# Patient Record
Sex: MALE | Race: WHITE | NOT HISPANIC OR LATINO | Employment: FULL TIME | URBAN - NONMETROPOLITAN AREA
[De-identification: names, ages, dates, MRNs, and addresses within clinical notes are randomized per-mention and may not be internally consistent; named-entity substitution may affect disease eponyms.]

---

## 2017-08-01 ENCOUNTER — OFFICE VISIT (OUTPATIENT)
Dept: URGENT CARE | Facility: CLINIC | Age: 37
End: 2017-08-01
Payer: COMMERCIAL

## 2017-08-01 PROCEDURE — 99213 OFFICE O/P EST LOW 20 MIN: CPT

## 2017-08-04 ENCOUNTER — ALLSCRIPTS OFFICE VISIT (OUTPATIENT)
Dept: OTHER | Facility: OTHER | Age: 37
End: 2017-08-04

## 2017-08-04 DIAGNOSIS — Z13.6 ENCOUNTER FOR SCREENING FOR CARDIOVASCULAR DISORDERS: ICD-10-CM

## 2017-08-04 DIAGNOSIS — E78.5 HYPERLIPIDEMIA: ICD-10-CM
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
## 2017-08-07 ENCOUNTER — TRANSCRIBE ORDERS (OUTPATIENT)
Dept: ADMINISTRATIVE | Facility: HOSPITAL | Age: 37
End: 2017-08-07

## 2017-08-07 ENCOUNTER — APPOINTMENT (OUTPATIENT)
Dept: LAB | Facility: HOSPITAL | Age: 37
End: 2017-08-07
Payer: COMMERCIAL
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
## 2017-08-07 PROCEDURE — 36415 COLL VENOUS BLD VENIPUNCTURE: CPT

## 2017-08-07 PROCEDURE — 86618 LYME DISEASE ANTIBODY: CPT

## 2017-08-07 PROCEDURE — 86617 LYME DISEASE ANTIBODY: CPT

## 2017-08-08 LAB
B BURGDOR IGG SER IA-ACNC: 0.26
B BURGDOR IGM SER IA-ACNC: 19.16

## 2017-08-10 LAB
B BURGDOR IGG PATRN SER IB-IMP: NEGATIVE
B BURGDOR IGM PATRN SER IB-IMP: NEGATIVE
B BURGDOR18KD IGG SER QL IB: ABNORMAL
B BURGDOR23KD IGG SER QL IB: PRESENT
B BURGDOR23KD IGM SER QL IB: PRESENT
B BURGDOR28KD IGG SER QL IB: ABNORMAL
B BURGDOR30KD IGG SER QL IB: PRESENT
B BURGDOR39KD IGG SER QL IB: ABNORMAL
B BURGDOR39KD IGM SER QL IB: ABNORMAL
B BURGDOR41KD IGG SER QL IB: PRESENT
B BURGDOR41KD IGM SER QL IB: ABNORMAL
B BURGDOR45KD IGG SER QL IB: PRESENT
B BURGDOR58KD IGG SER QL IB: ABNORMAL
B BURGDOR66KD IGG SER QL IB: ABNORMAL
B BURGDOR93KD IGG SER QL IB: ABNORMAL

## 2017-09-27 ENCOUNTER — TRANSCRIBE ORDERS (OUTPATIENT)
Dept: ADMINISTRATIVE | Facility: HOSPITAL | Age: 37
End: 2017-09-27

## 2017-09-27 ENCOUNTER — APPOINTMENT (OUTPATIENT)
Dept: LAB | Facility: HOSPITAL | Age: 37
End: 2017-09-27
Payer: COMMERCIAL

## 2017-09-27 DIAGNOSIS — Z13.6 ENCOUNTER FOR SCREENING FOR CARDIOVASCULAR DISORDERS: ICD-10-CM

## 2017-09-27 DIAGNOSIS — A69.20 LYME DISEASE: ICD-10-CM

## 2017-09-27 DIAGNOSIS — E78.5 HYPERLIPIDEMIA: ICD-10-CM

## 2017-09-27 LAB
ALBUMIN SERPL BCP-MCNC: 4.1 G/DL (ref 3.5–5)
ALP SERPL-CCNC: 109 U/L (ref 46–116)
ALT SERPL W P-5'-P-CCNC: 56 U/L (ref 12–78)
ANION GAP SERPL CALCULATED.3IONS-SCNC: 12 MMOL/L (ref 4–13)
AST SERPL W P-5'-P-CCNC: 26 U/L (ref 5–45)
BASOPHILS # BLD AUTO: 0.06 THOUSANDS/ΜL (ref 0–0.1)
BASOPHILS NFR BLD AUTO: 1 % (ref 0–1)
BILIRUB SERPL-MCNC: 0.7 MG/DL (ref 0.2–1)
BUN SERPL-MCNC: 16 MG/DL (ref 5–25)
CALCIUM SERPL-MCNC: 9.3 MG/DL (ref 8.3–10.1)
CHLORIDE SERPL-SCNC: 101 MMOL/L (ref 100–108)
CHOLEST SERPL-MCNC: 439 MG/DL (ref 50–200)
CO2 SERPL-SCNC: 25 MMOL/L (ref 21–32)
CREAT SERPL-MCNC: 0.91 MG/DL (ref 0.6–1.3)
EOSINOPHIL # BLD AUTO: 0.13 THOUSAND/ΜL (ref 0–0.61)
EOSINOPHIL NFR BLD AUTO: 2 % (ref 0–6)
ERYTHROCYTE [DISTWIDTH] IN BLOOD BY AUTOMATED COUNT: 12.3 % (ref 11.6–15.1)
GFR SERPL CREATININE-BSD FRML MDRD: 106 ML/MIN/1.73SQ M
GLUCOSE P FAST SERPL-MCNC: 96 MG/DL (ref 65–99)
HCT VFR BLD AUTO: 47 % (ref 36.5–49.3)
HDLC SERPL-MCNC: 63 MG/DL (ref 40–60)
HGB BLD-MCNC: 16.2 G/DL (ref 12–17)
INSULIN SERPL-ACNC: 9.3 MU/L (ref 3–25)
LDLC SERPL CALC-MCNC: 355 MG/DL (ref 0–100)
LYMPHOCYTES # BLD AUTO: 1.59 THOUSANDS/ΜL (ref 0.6–4.47)
LYMPHOCYTES NFR BLD AUTO: 27 % (ref 14–44)
MCH RBC QN AUTO: 30.9 PG (ref 26.8–34.3)
MCHC RBC AUTO-ENTMCNC: 34.5 G/DL (ref 31.4–37.4)
MCV RBC AUTO: 90 FL (ref 82–98)
MONOCYTES # BLD AUTO: 0.55 THOUSAND/ΜL (ref 0.17–1.22)
MONOCYTES NFR BLD AUTO: 10 % (ref 4–12)
NEUTROPHILS # BLD AUTO: 3.49 THOUSANDS/ΜL (ref 1.85–7.62)
NEUTS SEG NFR BLD AUTO: 60 % (ref 43–75)
PLATELET # BLD AUTO: 238 THOUSANDS/UL (ref 149–390)
PMV BLD AUTO: 9.8 FL (ref 8.9–12.7)
POTASSIUM SERPL-SCNC: 4.4 MMOL/L (ref 3.5–5.3)
PROT SERPL-MCNC: 7.6 G/DL (ref 6.4–8.2)
RBC # BLD AUTO: 5.24 MILLION/UL (ref 3.88–5.62)
SODIUM SERPL-SCNC: 138 MMOL/L (ref 136–145)
TRIGL SERPL-MCNC: 104 MG/DL
TSH SERPL DL<=0.05 MIU/L-ACNC: 2.56 UIU/ML (ref 0.36–3.74)
WBC # BLD AUTO: 5.82 THOUSAND/UL (ref 4.31–10.16)

## 2017-09-27 PROCEDURE — 86618 LYME DISEASE ANTIBODY: CPT

## 2017-09-27 PROCEDURE — 36415 COLL VENOUS BLD VENIPUNCTURE: CPT

## 2017-09-27 PROCEDURE — 86617 LYME DISEASE ANTIBODY: CPT

## 2017-09-27 PROCEDURE — 84443 ASSAY THYROID STIM HORMONE: CPT

## 2017-09-27 PROCEDURE — 80053 COMPREHEN METABOLIC PANEL: CPT

## 2017-09-27 PROCEDURE — 83525 ASSAY OF INSULIN: CPT

## 2017-09-27 PROCEDURE — 80061 LIPID PANEL: CPT

## 2017-09-27 PROCEDURE — 85025 COMPLETE CBC W/AUTO DIFF WBC: CPT

## 2017-09-28 LAB
B BURGDOR IGG SER IA-ACNC: 0.71
B BURGDOR IGM SER IA-ACNC: 2.14

## 2017-09-29 LAB
B BURGDOR IGG PATRN SER IB-IMP: NEGATIVE
B BURGDOR IGM PATRN SER IB-IMP: NEGATIVE
B BURGDOR18KD IGG SER QL IB: ABNORMAL
B BURGDOR23KD IGG SER QL IB: PRESENT
B BURGDOR23KD IGM SER QL IB: PRESENT
B BURGDOR28KD IGG SER QL IB: ABNORMAL
B BURGDOR30KD IGG SER QL IB: PRESENT
B BURGDOR39KD IGG SER QL IB: ABNORMAL
B BURGDOR39KD IGM SER QL IB: ABNORMAL
B BURGDOR41KD IGG SER QL IB: PRESENT
B BURGDOR41KD IGM SER QL IB: ABNORMAL
B BURGDOR45KD IGG SER QL IB: ABNORMAL
B BURGDOR58KD IGG SER QL IB: ABNORMAL
B BURGDOR66KD IGG SER QL IB: ABNORMAL
B BURGDOR93KD IGG SER QL IB: ABNORMAL

## 2017-10-04 ENCOUNTER — ALLSCRIPTS OFFICE VISIT (OUTPATIENT)
Dept: OTHER | Facility: OTHER | Age: 37
End: 2017-10-04

## 2017-11-15 DIAGNOSIS — E78.5 HYPERLIPIDEMIA: ICD-10-CM

## 2017-12-16 NOTE — PROGRESS NOTES
Assessment  1  Hyperlipidemia (272 4) (E78 5)   2  History of Lyme disease (V12 09) (Z86 19)   3  Encounter for preventive health examination (V70 0) (Z00 00)    Plan  Hyperlipidemia    · Begin or continue regular aerobic exercise  Gradually work up to at least 3 sessions of30 minutes of exercise a week ; Status:Complete;   Done: 10LCM4065   · Eat no more than 30 grams of fat per day ; Status:Complete;   Done: 73KJF4446   · There are many exercise options for seniors ; Status:Complete;   Done: 63XVS4817   · Call [phone] if: You have muscle cramps ; Status:Complete;   Done: 67HGB7821   · Call [phone] if: You have pain in the stomach area ; Status:Complete;   Done:54Cab1509   · Call [phone] if: You start vomiting ; Status:Complete;   Done: 23LXX8685   · Call 911 if: You experience a new kind of chest pain (angina) or pressure  ;Status:Complete;   Done: 95LGB4116   · Call 911 if: You have any symptoms of a stroke ; Status:Complete;   Done: 17RAQ9824   · (1) COMPREHENSIVE METABOLIC PANEL; Status:Active; Requested ELQ:72LAK6446;    · (1) COMPREHENSIVE METABOLIC PANEL; Status:Active; Requested for:06Rwb7950;    · (1) LIPID PANEL, FASTING; Status:Active; Requested VHL:92KJQ5167;    · (1) TSH; Status:Active; Requested IUR:42ZYH7441; Discussion/Summary    Reviewed his recent laboratory testing with him  Cholesterol is significantly elevated  He has been started on the atorvastatin  Has been tolerating this the us far  Potential side effects discussed  Watch diet more closely  Increase fiber  Increase activity level  Remainder of laboratory testing was good  He did have classic symptoms of Lyme disease over the summer  The symptoms responded to full course of doxycycline  Watch for any recurrence symptoms  Consider flu shot  Will have him follow up in about 3-4 months with laboratory testing prior to that visit  Follow up sooner if needed   He was told to get a repeat CMP done about 6-8 weeks after starting them medication for the cholesterol  Possible side effects of new medications were reviewed with the patient/guardian today  The treatment plan was reviewed with the patient/guardian  The patient/guardian understands and agrees with the treatment plan      Chief Complaint  CC Patient is here for follow up visit  No complaints  History of Present Illness  He presents to establish as a new patient  Has generally been doing relatively well  He did have a rash and fevers, chills, and body aches over the summer months  Lyme testing was equivocal but his symptoms were classic for Lyme disease  Symptoms resolved completely after full course of doxycycline  Remains very active  Tries to exercise on a regular basis  Remains very active with his kids  Has started the atorvastatin for the hyperlipidemia  Has not had any side effects thus far  Has a strong family history of high cholesterol  Denies any chest pain or palpitations  Denies any significant shortness of breath  Appetite has been generally good  Has been trying to watch what he eats but admits this is difficult at times  Denies any vision problems  Denies any nausea, vomiting, or diarrhea  Denies any changes in bowel movements  Usually sleeps relatively well  Review of Systems   Constitutional: not feeling poorly-- and-- not feeling tired  Eyes: no eyesight problems  ENT: no nosebleeds  Cardiovascular: no chest pain-- and-- no palpitations  Respiratory: no shortness of breath-- and-- no shortness of breath during exertion  Gastrointestinal: no nausea-- and-- no diarrhea  Genitourinary: no nocturia  Musculoskeletal: joint stiffness  Neurological: no headache-- and-- no dizziness  ROS reviewed  Active Problems  1  History of Lyme disease (V12 09) (Z86 19)   2  Hyperlipidemia (272 4) (E78 5)   3  Screening for cardiovascular condition (V81 2) (Z13 6)   4  Tick bite (919 4,E906 4) [facility])    Past Medical History  1   No pertinent past medical history    The active problems and past medical history were reviewed and updated today  Surgical History    The surgical history was reviewed and updated today  Family History  Family History    1  Family history of hyperlipidemia (V18 19) (Z83 49)   2  Family history of skin cancer (V16 8) (Z80 8)    The family history was reviewed and updated today  Social History   · Alcohol use (V49 89) (Z78 9)   · Daily caffeinated coffee consumption   · Never a smoker  The social history was reviewed and updated today  Current Meds   1  Atorvastatin Calcium 20 MG Oral Tablet; TAKE 1 TABLET DAILY AS DIRECTED; Therapy: 16VSX4450 to (Evaluate:98Rjd3797)  Requested for: 59WER1153; Last Rx:21Lfw4772 Ordered    The medication list was reviewed and updated today  Allergies  1  No Known Drug Allergies    Vitals  Vital Signs    Recorded: 46OTF8414 07:52AM   Temperature 97 1 F, Temporal   Heart Rate 88   Respiration 20   Systolic 012, LUE, Sitting   Diastolic 88, LUE, Sitting   Height 6 ft 1 in   Weight 248 lb    BMI Calculated 32 72   BSA Calculated 2 36   O2 Saturation 98     Physical Exam   Constitutional  General appearance: No acute distress, well appearing and well nourished  Eyes  Conjunctiva and lids: No swelling, erythema, or discharge  Pupils and irises: Equal, round and reactive to light  Ears, Nose, Mouth, and Throat  External inspection of ears and nose: Normal    Otoscopic examination: Tympanic membrance translucent with normal light reflex  Canals patent without erythema  Oropharynx: Normal with no erythema, edema, exudate or lesions  Pulmonary  Auscultation of lungs: Clear to auscultation, equal breath sounds bilaterally, no wheezes, no rales, no rhonci  Cardiovascular  Auscultation of heart: Normal rate and rhythm, normal S1 and S2, without murmurs     Examination of extremities for edema and/or varicosities: Normal    Lymphatic  Palpation of lymph nodes in neck: No lymphadenopathy  Musculoskeletal  Gait and station: Normal    Psychiatric  Mood and affect: Normal          Results/Data  (1) COMPREHENSIVE METABOLIC PANEL 88ORO1909 58:49ER Benito Bertrand Order Number: [id number]     Test Name Result Flag Reference   SODIUM 138 mmol/L  136-145   POTASSIUM 4 4 mmol/L  3 5-5 3   CHLORIDE 101 mmol/L  100-108   CARBON DIOXIDE 25 mmol/L  21-32   ANION GAP (CALC) 12 mmol/L  4-13   BLOOD UREA NITROGEN 16 mg/dL  5-25   CREATININE 0 91 mg/dL  0 60-1 30   Standardized to IDMS reference method   CALCIUM 9 3 mg/dL  8 3-10 1   BILI, TOTAL 0 70 mg/dL  0 20-1 00   ALK PHOSPHATAS 109 U/L     ALT (SGPT) 56 U/L  12-78   Specimen collection should occur prior to Sulfasalazine administration due to the potential for falsely depressed results  AST(SGOT) 26 U/L  5-45   Specimen collection should occur prior to Sulfasalazine administration due to the potential for falsely depressed results  ALBUMIN 4 1 g/dL  3 5-5 0   TOTAL PROTEIN 7 6 g/dL  6 4-8 2   eGFR 106 ml/min/1 73sq m     [facility] Disease Education Program recommendations are as follows: GFR calculation is accurate only with a steady state creatinine Chronic Kidney disease less than 60 ml/min/1 73 sq  meters Kidney failure less than 15 ml/min/1 73 sq  meters  GLUCOSE FASTING 96 mg/dL  65-99   Specimen collection should occur prior to Sulfasalazine administration due to the potential for falsely depressed results  Specimen collection should occur prior to Sulfapyridine administration due to the potential for falsely elevated results       (1) CBC/PLT/DIFF 48OTY6798 07:52AM Benito Bertrand Order Number: [id number]     Test Name Result Flag Reference   WBC COUNT 5 82 Thousand/uL  4 31-10 16   RBC COUNT 5 24 Million/uL  3 88-5 62   HEMOGLOBIN 16 2 g/dL  12 0-17 0   HEMATOCRIT 47 0 %  36 5-49 3   MCV 90 fL  82-98   MCH 30 9 pg  26 8-34 3   MCHC 34 5 g/dL  31 4-37 4   RDW 12 3 %  11 6-15 1   MPV 9 8 fL  8 9-12 7   PLATELET COUNT 085 Thousands/uL  149-390   NEUTROPHILS RELATIVE PERCENT 60 %  43-75   LYMPHOCYTES RELATIVE PERCENT 27 %  14-44   MONOCYTES RELATIVE PERCENT 10 %  4-12   EOSINOPHILS RELATIVE PERCENT 2 %  0-6   BASOPHILS RELATIVE PERCENT 1 %  0-1   NEUTROPHILS ABSOLUTE COUNT 3 49 Thousands/? ??L  1 85-7 62   LYMPHOCYTES ABSOLUTE COUNT 1 59 Thousands/? ??L  0 60-4 47   MONOCYTES ABSOLUTE COUNT 0 55 Thousand/? ??L  0 17-1 22   EOSINOPHILS ABSOLUTE COUNT 0 13 Thousand/? ??L  0 00-0 61   BASOPHILS ABSOLUTE COUNT 0 06 Thousands/? ??L  0 00-0 10     (1) INSULIN, FASTING 75CVC6115 07:52AM Risk I/OsharonAtrica Justin Hands Order Number: [id number]     Test Name Result Flag Reference   INSULIN, FASTING 9 3 mU/L  3 0-25 0     (1) LIPID PANEL, FASTING 06NAI7386 07:52AM Risk I/OsharonAtrica Justin Hands Order Number: [id number]     Test Name Result Flag Reference   CHOLESTEROL 439 mg/dL H    HDL,DIRECT 63 mg/dL H 40-60   Specimen collection should occur prior to Metamizole administration due to the potential for falsley depressed results  LDL CHOLESTEROL CALCULATED 355 mg/dL H 0-100   Unable to calculate   Triglyceride:       Normal <150 mg/dl  Borderline High 150-199 mg/dl  High 200-499 mg/dl  Very High >499 mg/dl   Cholesterol:      Desirable <200 mg/dl   Borderline High 200-239 mg/dl   High >239 mg/dl   HDL Cholesterol:      High>59 mg/dL   Low <41 mg/dL   This screening LDL is a calculated result  It does not have the accuracy of the Direct Measured LDL in the monitoring of patients with hyperlipidemia and/or statin therapy  Direct Measure LDL (VUU140) must be ordered separately in these patients  TRIGLYCERIDES 104 mg/dL  <=150   Specimen collection should occur prior to N-Acetylcysteine or Metamizole administration due to the potential for falsely depressed results       (1) TSH 59VFT8794 07:52AM Thomsa Whatley    Order Number: [id number]     Test Name Result Flag Reference   TSH 2 565 uIU/mL  0 358-3 740   Patients undergoing fluorescein dye angiography may retain small amounts of fluorescein in the body for 48-72 hours post procedure  Samples containing fluorescein can produce falsely depressed TSH values  If the patient had this procedure,a specimen should be resubmitted post fluorescein clearance  (1) LYME ANTIBODY PROFILE [facility] TO WESTERN BLOT 57ABB1883 07:52DAVIN Underwood    Order Number: [id number]     Test Name Result Flag Reference   LYME IGG 0 71  0 00-0 79   NEGATIVE(0 00-0 79)-Absence of detectable Borrelia IgG Antibodies  A negative result does not exclude the possibility of Borrelia infection  If early Lyme disease is suspected,a second sample should be collected & tested 4 weeks after initial testing  LYME IGM 2 14 H 0 00-0 79   POSITIVE (> or = 1 20) - Presence of Borrelia IgM Antibodies  Current testing guidelines recommend that all positive samples be supplemented by further testing  Sample forwarded to reference lab for western blot assay  (1) LYME ANTIBODY PROFILE [facility] TO WESTERN BLOT [id number] 07:52 Sylvia Underwood    Order Number: [id number]     Test Name Result Flag Reference   LYME IGG 0 71  0 00-0 79   NEGATIVE(0 00-0 79)-Absence of detectable Borrelia IgG Antibodies  A negative result does not exclude the possibility of Borrelia infection  If early Lyme disease is suspected,a second sample should be collected & tested 4 weeks after initial testing  LYME IGM 2 14 H 0 00-0 79   POSITIVE (> or = 1 20) - Presence of Borrelia IgM Antibodies  Current testing guidelines recommend that all positive samples be supplemented by further testing  Sample forwarded to reference lab for western blot assay     LYME 18 KD IGG Absent     LYME 23 KD IGG Present A    LYME 28 KD IGG Absent     LYME 30 KD IGG Present A    LYME 39 KD IGG Absent     LYME 39 KD IGM Absent     LYME 41 KD IGG Present A    LYME 45 KD IGG Absent     LYME 58 KD IGG Absent     LYME 66 KD IGG Absent     LYME 93 KD IGG Absent     LYME 23 KD IGM Present A    LYME 41 KD IGM Absent     LYME IGG WB INTERP  Negative     Positive: 5 of the following                                Borrelia-specific bands:                                18,23,28,30,39,41,45,58,                                66, and 93  Negative: No bands or banding                                patterns which do not                                meet positive criteria  LYME IGM WB INTERP  Negative     Note: An equivocal or positive EIA result followed by a negative Western Blot result is considered NEGATIVE  An equivocal or positive EIA result followed by a positive Western Blot is considered POSITIVE by the CDC  Positive: 2 of the following bands: 23,39 or 41 Negative: No bands or banding patterns which do not meet positive criteria  Criteria for positivity are those recommended by CDC/ASTPHLD  p23=Osp C, i87=pbygmajap Note: Sera from individuals with the following may cross react in the Lyme Western Blot assays: other spirochetal diseases (periodontal disease, leptospirosis, relapsing fever, yaws, and pinta); connective autoimmune (Rheumatoid Arthritis and Systemic Lupus Erythematosus and also individuals with Antinuclear Antibody); other infections COFFEE [facility] Spotted Fever; Lindy-Barr Virus, and Cytomegalovirus)  Performed at:  [address] [place] [address]  [id number] : Callum Capellan MD, Phone:  [phone]     Future Appointments    Date/Time Provider Specialty Site   01/03/2018 08:20 AM ADRIAN Arthur   [address] [facility] PRIMARY CARE [place] [address]     Signatures   Electronically signed by : ADRIAN Anthony ; Dec 15 2017 12:40PM EST                       (Author)

## 2018-01-03 DIAGNOSIS — E78.5 HYPERLIPIDEMIA: ICD-10-CM

## 2018-01-12 VITALS
BODY MASS INDEX: 32.87 KG/M2 | OXYGEN SATURATION: 98 % | HEIGHT: 73 IN | RESPIRATION RATE: 20 BRPM | SYSTOLIC BLOOD PRESSURE: 132 MMHG | TEMPERATURE: 97.1 F | DIASTOLIC BLOOD PRESSURE: 88 MMHG | HEART RATE: 88 BPM | WEIGHT: 248 LBS

## 2018-01-14 VITALS
SYSTOLIC BLOOD PRESSURE: 124 MMHG | TEMPERATURE: 96.4 F | WEIGHT: 249 LBS | DIASTOLIC BLOOD PRESSURE: 80 MMHG | OXYGEN SATURATION: 98 % | BODY MASS INDEX: 33 KG/M2 | HEART RATE: 82 BPM | RESPIRATION RATE: 20 BRPM | HEIGHT: 73 IN

## 2023-01-23 ENCOUNTER — OFFICE VISIT (OUTPATIENT)
Dept: URGENT CARE | Facility: MEDICAL CENTER | Age: 43
End: 2023-01-23

## 2023-01-23 DIAGNOSIS — S69.92XA INJURY OF LEFT RING FINGER, INITIAL ENCOUNTER: Primary | ICD-10-CM

## 2023-01-23 NOTE — PATIENT INSTRUCTIONS
Monitor for change in symptoms, any change or worsening of symptoms please be re-evaluated immediately

## 2023-01-23 NOTE — PROGRESS NOTES
[facility]        NAME: Sita Medina is a 38 y o  male  : 1980    MRN: [id number]  DATE: 2023  TIME: 12:16 PM    Assessment and Plan   Injury of left ring finger, initial encounter [S69 92XA]  1  Injury of left ring finger, initial encounter              Patient Instructions     Please see the patient's After Visit Summary for patient provided instructions  Other verbal instructions given as noted within  Follow up with PCP in 3-5 days  Proceed to  ER if symptoms worsen  Chief Complaint   No chief complaint on file  History of Present Illness       Patient Jammed left ring finger yesterday  Patient put ice on it last night, but this AM noted increased swelling and started with some tingling  He had previously not been able to get his ring off due to increased weight - presents today for assistance of getting wedding ring off  Patient has good ROM  Review of Systems   Review of Systems   Constitutional: Negative for chills  HENT: Negative  Eyes: Negative  Respiratory: Negative  Cardiovascular: Negative  Musculoskeletal:        Left ring finger swollen/ecchymotic  All other systems reviewed and are negative  Current Medications     No current outpatient medications on file  Current Allergies     Allergies as of 2023   • (No Known Allergies)            The following portions of the patient's history were reviewed and updated as appropriate: allergies, current medications, past family history, past medical history, past social history, past surgical history and problem list      History reviewed  No pertinent past medical history  History reviewed  No pertinent surgical history  History reviewed  No pertinent family history  Medications have been verified  Objective   There were no vitals taken for this visit  Physical Exam     Physical Exam  Vitals and nursing note reviewed     Constitutional: Appearance: Normal appearance  He is normal weight  HENT:      Head: Normocephalic  Mouth/Throat:      Mouth: Mucous membranes are moist    Eyes:      Extraocular Movements: Extraocular movements intact  Conjunctiva/sclera: Conjunctivae normal       Pupils: Pupils are equal, round, and reactive to light  Cardiovascular:      Rate and Rhythm: Normal rate  Pulses: Normal pulses  Pulmonary:      Effort: Pulmonary effort is normal    Musculoskeletal:         General: Swelling and signs of injury present  No tenderness  Right hand: Normal       Left hand: Swelling present  No deformity, lacerations or tenderness  Normal range of motion  Normal capillary refill  Normal pulse  Hands:       Cervical back: Normal range of motion  Comments: Offered to do xray- due to patient's injury- patient declined at this time  Patient initially had some tingling and numbness I finger, however as soon as the ring was removed he said it instantly was coming back  Skin:     General: Skin is warm  Capillary Refill: Capillary refill takes less than 2 seconds  Neurological:      General: No focal deficit present  Mental Status: He is alert  Psychiatric:         Mood and Affect: Mood normal      Universal Protocol:  Consent: Verbal consent obtained  Risks and benefits: risks, benefits and alternatives were discussed  Consent given by: patient  Time out: Immediately prior to procedure a "time out" was called to verify the correct patient, procedure, equipment, support staff and site/side marked as required    Patient understanding: patient states understanding of the procedure being performed  Patient consent: the patient's understanding of the procedure matches consent given  Procedure consent: procedure consent matches procedure scheduled    Foreign body removal    Date/Time: 1/23/2023 12:02 PM  Performed by: CHAPIN Spain  Authorized by: CHAPIN Spain   Body area: skin  General location: upper extremity  Location details: left ring finger    Sedation:  Patient sedated: no  Patient restrained: no  Complexity: simple  1 objects recovered  Objects recovered: Wedding band cut using ring cutter, 1 cut needed  Post-procedure assessment: foreign body removed  Patient tolerance: patient tolerated the procedure well with no immediate complications  Comments: Patient had ring in hand upon discharge from the department  Instructed on monitoring finger/extremity for worsening of symptoms

## 2023-07-10 ENCOUNTER — OFFICE VISIT (OUTPATIENT)
Dept: FAMILY MEDICINE CLINIC | Facility: CLINIC | Age: 43
End: 2023-07-10
Payer: COMMERCIAL

## 2023-07-10 VITALS
HEIGHT: 73 IN | DIASTOLIC BLOOD PRESSURE: 92 MMHG | WEIGHT: 253.4 LBS | HEART RATE: 108 BPM | BODY MASS INDEX: 33.58 KG/M2 | TEMPERATURE: 98.6 F | SYSTOLIC BLOOD PRESSURE: 148 MMHG | OXYGEN SATURATION: 98 %

## 2023-07-10 DIAGNOSIS — M79.645 FINGER PAIN, LEFT: ICD-10-CM

## 2023-07-10 DIAGNOSIS — Z76.89 ENCOUNTER TO ESTABLISH CARE WITH NEW DOCTOR: Primary | ICD-10-CM

## 2023-07-10 DIAGNOSIS — Z23 ENCOUNTER FOR IMMUNIZATION: ICD-10-CM

## 2023-07-10 DIAGNOSIS — I10 ESSENTIAL HYPERTENSION: ICD-10-CM

## 2023-07-10 DIAGNOSIS — E78.00 HYPERCHOLESTEROLEMIA: ICD-10-CM

## 2023-07-10 DIAGNOSIS — Z11.59 NEED FOR HEPATITIS C SCREENING TEST: ICD-10-CM

## 2023-07-10 DIAGNOSIS — E66.09 CLASS 1 OBESITY DUE TO EXCESS CALORIES WITH SERIOUS COMORBIDITY AND BODY MASS INDEX (BMI) OF 33.0 TO 33.9 IN ADULT: ICD-10-CM

## 2023-07-10 DIAGNOSIS — Z11.4 SCREENING FOR HIV (HUMAN IMMUNODEFICIENCY VIRUS): ICD-10-CM

## 2023-07-10 PROCEDURE — 99203 OFFICE O/P NEW LOW 30 MIN: CPT | Performed by: FAMILY MEDICINE

## 2023-07-10 PROCEDURE — 90471 IMMUNIZATION ADMIN: CPT

## 2023-07-10 PROCEDURE — 90715 TDAP VACCINE 7 YRS/> IM: CPT

## 2023-07-10 NOTE — PROGRESS NOTES
Assessment/Plan:    No problem-specific Assessment & Plan notes found for this encounter. Diagnoses and all orders for this visit:    Encounter to establish care with new doctor    Essential hypertension  -     CBC and differential; Future  -     TSH, 3rd generation with Free T4 reflex; Future    Hypercholesterolemia  -     Comprehensive metabolic panel; Future  -     Lipid panel; Future    Class 1 obesity due to excess calories with serious comorbidity and body mass index (BMI) of 33.0 to 33.9 in adult  Comments:  pt counseled on diet and exercise    Need for hepatitis C screening test  -     Hepatitis C Antibody; Future    Screening for HIV (human immunodeficiency virus)  -     HIV 1/2 AG/AB w Reflex [facility] for 2 yr old and above; Future    Encounter for immunization  -     TDAP VACCINE GREATER THAN OR EQUAL TO 8YO IM    Finger pain, left  -     XR hand 3+ vw left; Future          PHQ-2/9 Depression Screening    Little interest or pleasure in doing things: 0 - not at all  Feeling down, depressed, or hopeless: 0 - not at all  PHQ-2 Score: 0  PHQ-2 Interpretation: Negative depression screen        BMI Counseling: Body mass index is 33.43 kg/m². The BMI is above normal. Nutrition recommendations include reducing portion sizes and 3-5 servings of fruits/vegetables daily. Exercise recommendations include moderate aerobic physical activity for 150 minutes/week and exercising 3-5 times per week. Subjective:      Patient ID: Rosita Gee is a 37 y.o. male.     New pt here to establish with hypercholesterolemia, no surgeries, non smoker, regular EtoH, no illegal drugs, , 3 children 23, 12, 15, works as  at [address] [employer] following portions of the patient's history were reviewed and updated as appropriate: allergies, current medications, past family history, past medical history, past social history, past surgical history and problem list.    Review of Systems   Constitutional: Negative. Negative for chills, fatigue and fever. HENT: Negative. Negative for hearing loss. Eyes: Negative. Negative for visual disturbance. Respiratory: Negative for shortness of breath and wheezing. Cardiovascular: Negative for chest pain and palpitations. Gastrointestinal: Negative for abdominal pain, blood in stool, constipation, diarrhea, nausea and vomiting. Endocrine: Negative. Genitourinary: Negative for difficulty urinating and dysuria. Musculoskeletal: Negative for arthralgias and myalgias. Skin: Negative. Allergic/Immunologic: Negative. Neurological: Negative for seizures and syncope. Hematological: Negative for adenopathy. Psychiatric/Behavioral: Negative. Objective:    /92 (BP Location: Left arm, Patient Position: Sitting)   Pulse (!) 108   Temp 98.6 °F (37 °C) (Tympanic)   Ht 6' 1" (1.854 m)   Wt 115 kg (253 lb 6.4 oz)   SpO2 98%   BMI 33.43 kg/m²      Physical Exam  Vitals and nursing note reviewed. Constitutional:       General: He is not in acute distress. Appearance: Normal appearance. He is well-developed. He is not ill-appearing, toxic-appearing or diaphoretic. HENT:      Head: Normocephalic and atraumatic. Right Ear: Tympanic membrane, ear canal and external ear normal. There is no impacted cerumen. Left Ear: Tympanic membrane, ear canal and external ear normal. There is no impacted cerumen. Nose: Nose normal. No congestion or rhinorrhea. Mouth/Throat:      Mouth: Mucous membranes are moist.      Pharynx: Oropharynx is clear. No oropharyngeal exudate or posterior oropharyngeal erythema. Eyes:      General: No scleral icterus. Right eye: No discharge. Left eye: No discharge. Extraocular Movements: Extraocular movements intact. Conjunctiva/sclera: Conjunctivae normal.      Pupils: Pupils are equal, round, and reactive to light.    Cardiovascular:      Rate and Rhythm: Normal rate and regular rhythm. Pulses: Normal pulses. Heart sounds: Normal heart sounds. No murmur heard. No friction rub. No gallop. Pulmonary:      Effort: Pulmonary effort is normal. No respiratory distress. Breath sounds: Normal breath sounds. No wheezing, rhonchi or rales. Abdominal:      General: Bowel sounds are normal. There is no distension. Palpations: Abdomen is soft. There is no mass. Tenderness: There is no abdominal tenderness. There is no guarding or rebound. Musculoskeletal:         General: No swelling or deformity. Normal range of motion. Cervical back: Normal range of motion and neck supple. No rigidity. Right lower leg: No edema. Left lower leg: No edema. Lymphadenopathy:      Cervical: No cervical adenopathy. Skin:     General: Skin is warm and dry. Findings: No rash. Neurological:      General: No focal deficit present. Mental Status: He is alert and oriented to person, place, and time. Sensory: No sensory deficit. Motor: No weakness. Coordination: Coordination normal.      Gait: Gait normal.      Deep Tendon Reflexes: Reflexes are normal and symmetric. Reflexes normal.   Psychiatric:         Mood and Affect: Mood normal.         Behavior: Behavior normal.         Thought Content:  Thought content normal.         Judgment: Judgment normal.

## 2023-07-19 ENCOUNTER — HOSPITAL ENCOUNTER (OUTPATIENT)
Dept: RADIOLOGY | Facility: HOSPITAL | Age: 43
Discharge: HOME | End: 2023-07-19
Payer: COMMERCIAL

## 2023-07-19 ENCOUNTER — APPOINTMENT (OUTPATIENT)
Dept: LAB | Facility: HOSPITAL | Age: 43
End: 2023-07-19
Payer: COMMERCIAL

## 2023-07-19 DIAGNOSIS — Z11.4 SCREENING FOR HIV (HUMAN IMMUNODEFICIENCY VIRUS): ICD-10-CM

## 2023-07-19 DIAGNOSIS — E78.00 HYPERCHOLESTEROLEMIA: ICD-10-CM

## 2023-07-19 DIAGNOSIS — I10 ESSENTIAL HYPERTENSION: ICD-10-CM

## 2023-07-19 DIAGNOSIS — M79.645 FINGER PAIN, LEFT: ICD-10-CM

## 2023-07-19 DIAGNOSIS — Z11.59 NEED FOR HEPATITIS C SCREENING TEST: ICD-10-CM

## 2023-07-19 LAB
ALBUMIN SERPL BCP-MCNC: 4.4 G/DL (ref 3.5–5)
ALP SERPL-CCNC: 95 U/L (ref 34–104)
ALT SERPL W P-5'-P-CCNC: 28 U/L (ref 7–52)
ANION GAP SERPL CALCULATED.3IONS-SCNC: 8 MMOL/L
AST SERPL W P-5'-P-CCNC: 19 U/L (ref 13–39)
BASOPHILS # BLD AUTO: 0.08 THOUSANDS/ÂΜL (ref 0–0.1)
BASOPHILS NFR BLD AUTO: 1 % (ref 0–1)
BILIRUB SERPL-MCNC: 0.52 MG/DL (ref 0.2–1)
BUN SERPL-MCNC: 12 MG/DL (ref 5–25)
CALCIUM SERPL-MCNC: 9.8 MG/DL (ref 8.4–10.2)
CHLORIDE SERPL-SCNC: 102 MMOL/L (ref 96–108)
CHOLEST SERPL-MCNC: 409 MG/DL
CO2 SERPL-SCNC: 28 MMOL/L (ref 21–32)
CREAT SERPL-MCNC: 0.84 MG/DL (ref 0.6–1.3)
EOSINOPHIL # BLD AUTO: 0.19 THOUSAND/ÂΜL (ref 0–0.61)
EOSINOPHIL NFR BLD AUTO: 3 % (ref 0–6)
ERYTHROCYTE [DISTWIDTH] IN BLOOD BY AUTOMATED COUNT: 12.2 % (ref 11.6–15.1)
GFR SERPL CREATININE-BSD FRML MDRD: 105 ML/MIN/1.73SQ M
GLUCOSE P FAST SERPL-MCNC: 110 MG/DL (ref 65–99)
HCT VFR BLD AUTO: 46.8 % (ref 36.5–49.3)
HCV AB SER QL: NORMAL
HDLC SERPL-MCNC: 55 MG/DL
HGB BLD-MCNC: 16 G/DL (ref 12–17)
HIV 1+2 AB+HIV1 P24 AG SERPL QL IA: NORMAL
HIV 2 AB SERPL QL IA: NORMAL
HIV1 AB SERPL QL IA: NORMAL
HIV1 P24 AG SERPL QL IA: NORMAL
IMM GRANULOCYTES # BLD AUTO: 0.01 THOUSAND/UL (ref 0–0.2)
IMM GRANULOCYTES NFR BLD AUTO: 0 % (ref 0–2)
LDLC SERPL CALC-MCNC: 337 MG/DL (ref 0–100)
LYMPHOCYTES # BLD AUTO: 1.73 THOUSANDS/ÂΜL (ref 0.6–4.47)
LYMPHOCYTES NFR BLD AUTO: 31 % (ref 14–44)
MCH RBC QN AUTO: 32.3 PG (ref 26.8–34.3)
MCHC RBC AUTO-ENTMCNC: 34.2 G/DL (ref 31.4–37.4)
MCV RBC AUTO: 95 FL (ref 82–98)
MONOCYTES # BLD AUTO: 0.57 THOUSAND/ÂΜL (ref 0.17–1.22)
MONOCYTES NFR BLD AUTO: 10 % (ref 4–12)
NEUTROPHILS # BLD AUTO: 2.94 THOUSANDS/ÂΜL (ref 1.85–7.62)
NEUTS SEG NFR BLD AUTO: 55 % (ref 43–75)
NONHDLC SERPL-MCNC: 354 MG/DL
NRBC BLD AUTO-RTO: 0 /100 WBCS
PLATELET # BLD AUTO: 227 THOUSANDS/UL (ref 149–390)
PMV BLD AUTO: 9.3 FL (ref 8.9–12.7)
POTASSIUM SERPL-SCNC: 4 MMOL/L (ref 3.5–5.3)
PROT SERPL-MCNC: 7.3 G/DL (ref 6.4–8.4)
RBC # BLD AUTO: 4.95 MILLION/UL (ref 3.88–5.62)
SODIUM SERPL-SCNC: 138 MMOL/L (ref 135–147)
TRIGL SERPL-MCNC: 85 MG/DL
TSH SERPL DL<=0.05 MIU/L-ACNC: 4.36 UIU/ML (ref 0.45–4.5)
WBC # BLD AUTO: 5.52 THOUSAND/UL (ref 4.31–10.16)

## 2023-07-19 PROCEDURE — 36415 COLL VENOUS BLD VENIPUNCTURE: CPT

## 2023-07-19 PROCEDURE — 80053 COMPREHEN METABOLIC PANEL: CPT

## 2023-07-19 PROCEDURE — 86803 HEPATITIS C AB TEST: CPT

## 2023-07-19 PROCEDURE — 73130 X-RAY EXAM OF HAND: CPT

## 2023-07-19 PROCEDURE — 87389 HIV-1 AG W/HIV-1&-2 AB AG IA: CPT

## 2023-07-19 PROCEDURE — 84443 ASSAY THYROID STIM HORMONE: CPT

## 2023-07-19 PROCEDURE — 80061 LIPID PANEL: CPT

## 2023-07-19 PROCEDURE — 85025 COMPLETE CBC W/AUTO DIFF WBC: CPT

## 2023-07-24 ENCOUNTER — RA CDI HCC (OUTPATIENT)
Dept: OTHER | Facility: HOSPITAL | Age: 43
End: 2023-07-24

## 2023-07-24 NOTE — PROGRESS NOTES
[address] [facility] coding opportunities       Chart reviewed, no opportunity found: CHART REVIEWED, NO OPPORTUNITY FOUND        Patients Insurance        Commercial Insurance: [employer]

## 2023-08-03 ENCOUNTER — OFFICE VISIT (OUTPATIENT)
Dept: FAMILY MEDICINE CLINIC | Facility: CLINIC | Age: 43
End: 2023-08-03
Payer: COMMERCIAL

## 2023-08-03 VITALS
BODY MASS INDEX: 32.47 KG/M2 | HEIGHT: 73 IN | DIASTOLIC BLOOD PRESSURE: 89 MMHG | HEART RATE: 99 BPM | OXYGEN SATURATION: 97 % | WEIGHT: 245 LBS | SYSTOLIC BLOOD PRESSURE: 139 MMHG | TEMPERATURE: 98.8 F

## 2023-08-03 DIAGNOSIS — E78.00 HYPERCHOLESTEROLEMIA: ICD-10-CM

## 2023-08-03 DIAGNOSIS — Z00.00 ANNUAL PHYSICAL EXAM: Primary | ICD-10-CM

## 2023-08-03 DIAGNOSIS — E66.09 CLASS 1 OBESITY DUE TO EXCESS CALORIES WITH SERIOUS COMORBIDITY AND BODY MASS INDEX (BMI) OF 32.0 TO 32.9 IN ADULT: ICD-10-CM

## 2023-08-03 PROCEDURE — 99396 PREV VISIT EST AGE 40-64: CPT | Performed by: FAMILY MEDICINE

## 2023-08-03 PROCEDURE — 99213 OFFICE O/P EST LOW 20 MIN: CPT | Performed by: FAMILY MEDICINE

## 2023-08-03 RX ORDER — ATORVASTATIN CALCIUM 20 MG/1
20 TABLET, FILM COATED ORAL DAILY
Qty: 30 TABLET | Refills: 6 | Status: SHIPPED | OUTPATIENT
Start: 2023-08-03

## 2023-08-03 NOTE — PROGRESS NOTES
Assessment/Plan:    No problem-specific Assessment & Plan notes found for this encounter. Diagnoses and all orders for this visit:    Annual physical exam    Hypercholesterolemia  -     atorvastatin (LIPITOR) 20 mg tablet; Take 1 tablet (20 mg total) by mouth daily  -     Comprehensive metabolic panel; Future  -     Lipid panel; Future    Class 1 obesity due to excess calories with serious comorbidity and body mass index (BMI) of 32.0 to 32.9 in adult  Comments:  pt counseled on diet and exercise          PHQ-2/9 Depression Screening    Little interest or pleasure in doing things: 0 - not at all  Feeling down, depressed, or hopeless: 0 - not at all  PHQ-2 Score: 0  PHQ-2 Interpretation: Negative depression screen            Subjective:      Patient ID: Jamarcus Saunders is a 37 y.o. male. Pt here for annual physical      The following portions of the patient's history were reviewed and updated as appropriate: allergies, current medications, past family history, past medical history, past social history, past surgical history and problem list.    Review of Systems   Constitutional: Negative. Negative for chills, fatigue and fever. HENT: Negative. Negative for hearing loss. Eyes: Negative. Negative for visual disturbance. Respiratory: Negative for shortness of breath and wheezing. Cardiovascular: Negative for chest pain and palpitations. Gastrointestinal: Negative for abdominal pain, blood in stool, constipation, diarrhea, nausea and vomiting. Endocrine: Negative. Genitourinary: Negative for difficulty urinating and dysuria. Musculoskeletal: Negative for arthralgias and myalgias. Skin: Negative. Allergic/Immunologic: Negative. Neurological: Negative for seizures and syncope. Hematological: Negative for adenopathy. Psychiatric/Behavioral: Negative.           Objective:    /89   Pulse 99   Temp 98.8 °F (37.1 °C)   Ht 6' 1" (1.854 m)   Wt 111 kg (245 lb)   SpO2 97% BMI 32.32 kg/m²      Physical Exam  Vitals and nursing note reviewed. Constitutional:       General: He is not in acute distress. Appearance: Normal appearance. He is well-developed. He is not ill-appearing, toxic-appearing or diaphoretic. HENT:      Head: Normocephalic and atraumatic. Right Ear: Tympanic membrane, ear canal and external ear normal. There is no impacted cerumen. Left Ear: Tympanic membrane, ear canal and external ear normal. There is no impacted cerumen. Nose: Nose normal. No congestion or rhinorrhea. Mouth/Throat:      Mouth: Mucous membranes are moist.      Pharynx: Oropharynx is clear. No oropharyngeal exudate or posterior oropharyngeal erythema. Eyes:      General: No scleral icterus. Right eye: No discharge. Left eye: No discharge. Extraocular Movements: Extraocular movements intact. Conjunctiva/sclera: Conjunctivae normal.      Pupils: Pupils are equal, round, and reactive to light. Cardiovascular:      Rate and Rhythm: Normal rate and regular rhythm. Pulses: Normal pulses. Heart sounds: Normal heart sounds. No murmur heard. No friction rub. No gallop. Pulmonary:      Effort: Pulmonary effort is normal. No respiratory distress. Breath sounds: Normal breath sounds. No wheezing, rhonchi or rales. Abdominal:      General: Bowel sounds are normal. There is no distension. Palpations: Abdomen is soft. There is no mass. Tenderness: There is no abdominal tenderness. There is no guarding or rebound. Musculoskeletal:         General: No swelling or deformity. Normal range of motion. Cervical back: Normal range of motion and neck supple. No rigidity. Right lower leg: No edema. Left lower leg: No edema. Lymphadenopathy:      Cervical: No cervical adenopathy. Skin:     General: Skin is warm and dry. Findings: No rash. Neurological:      General: No focal deficit present.       Mental Status: He is alert and oriented to person, place, and time. Sensory: No sensory deficit. Motor: No weakness. Coordination: Coordination normal.      Gait: Gait normal.      Deep Tendon Reflexes: Reflexes are normal and symmetric. Reflexes normal.   Psychiatric:         Mood and Affect: Mood normal.         Behavior: Behavior normal.         Thought Content: Thought content normal.         Judgment: Judgment normal.       BMI Counseling: Body mass index is 32.32 kg/m². The BMI is above normal. Nutrition recommendations include reducing portion sizes and 3-5 servings of fruits/vegetables daily. Exercise recommendations include moderate aerobic physical activity for 150 minutes/week and exercising 3-5 times per week.

## 2023-10-05 ENCOUNTER — APPOINTMENT (OUTPATIENT)
Dept: LAB | Facility: HOSPITAL | Age: 43
End: 2023-10-05
Payer: COMMERCIAL

## 2023-10-05 DIAGNOSIS — E78.00 HYPERCHOLESTEROLEMIA: ICD-10-CM

## 2023-10-05 LAB
ALBUMIN SERPL BCP-MCNC: 4.5 G/DL (ref 3.5–5)
ALP SERPL-CCNC: 106 U/L (ref 34–104)
ALT SERPL W P-5'-P-CCNC: 33 U/L (ref 7–52)
ANION GAP SERPL CALCULATED.3IONS-SCNC: 8 MMOL/L
AST SERPL W P-5'-P-CCNC: 22 U/L (ref 13–39)
BILIRUB SERPL-MCNC: 0.57 MG/DL (ref 0.2–1)
BUN SERPL-MCNC: 14 MG/DL (ref 5–25)
CALCIUM SERPL-MCNC: 9.7 MG/DL (ref 8.4–10.2)
CHLORIDE SERPL-SCNC: 102 MMOL/L (ref 96–108)
CHOLEST SERPL-MCNC: 289 MG/DL
CO2 SERPL-SCNC: 28 MMOL/L (ref 21–32)
CREAT SERPL-MCNC: 0.88 MG/DL (ref 0.6–1.3)
GFR SERPL CREATININE-BSD FRML MDRD: 103 ML/MIN/1.73SQ M
GLUCOSE P FAST SERPL-MCNC: 115 MG/DL (ref 65–99)
HDLC SERPL-MCNC: 64 MG/DL
LDLC SERPL CALC-MCNC: 211 MG/DL (ref 0–100)
NONHDLC SERPL-MCNC: 225 MG/DL
POTASSIUM SERPL-SCNC: 4.1 MMOL/L (ref 3.5–5.3)
PROT SERPL-MCNC: 7.4 G/DL (ref 6.4–8.4)
SODIUM SERPL-SCNC: 138 MMOL/L (ref 135–147)
TRIGL SERPL-MCNC: 70 MG/DL

## 2023-10-05 PROCEDURE — 36415 COLL VENOUS BLD VENIPUNCTURE: CPT

## 2023-10-05 PROCEDURE — 80061 LIPID PANEL: CPT

## 2023-10-05 PROCEDURE — 80053 COMPREHEN METABOLIC PANEL: CPT

## 2023-10-12 ENCOUNTER — OFFICE VISIT (OUTPATIENT)
Dept: FAMILY MEDICINE CLINIC | Facility: CLINIC | Age: 43
End: 2023-10-12
Payer: COMMERCIAL

## 2023-10-12 VITALS
WEIGHT: 245 LBS | TEMPERATURE: 98.4 F | OXYGEN SATURATION: 97 % | DIASTOLIC BLOOD PRESSURE: 78 MMHG | HEIGHT: 73 IN | HEART RATE: 117 BPM | SYSTOLIC BLOOD PRESSURE: 106 MMHG | BODY MASS INDEX: 32.47 KG/M2

## 2023-10-12 DIAGNOSIS — E66.09 CLASS 1 OBESITY DUE TO EXCESS CALORIES WITH SERIOUS COMORBIDITY AND BODY MASS INDEX (BMI) OF 32.0 TO 32.9 IN ADULT: ICD-10-CM

## 2023-10-12 DIAGNOSIS — Z23 ENCOUNTER FOR IMMUNIZATION: ICD-10-CM

## 2023-10-12 DIAGNOSIS — E78.00 HYPERCHOLESTEROLEMIA: Primary | ICD-10-CM

## 2023-10-12 PROBLEM — E66.811 CLASS 1 OBESITY DUE TO EXCESS CALORIES WITH SERIOUS COMORBIDITY AND BODY MASS INDEX (BMI) OF 32.0 TO 32.9 IN ADULT: Status: ACTIVE | Noted: 2023-10-12

## 2023-10-12 PROCEDURE — 90686 IIV4 VACC NO PRSV 0.5 ML IM: CPT

## 2023-10-12 PROCEDURE — 90471 IMMUNIZATION ADMIN: CPT

## 2023-10-12 PROCEDURE — 99213 OFFICE O/P EST LOW 20 MIN: CPT | Performed by: FAMILY MEDICINE

## 2023-10-12 RX ORDER — ATORVASTATIN CALCIUM 40 MG/1
40 TABLET, FILM COATED ORAL DAILY
Qty: 30 TABLET | Refills: 6 | Status: SHIPPED | OUTPATIENT
Start: 2023-10-12

## 2023-10-12 NOTE — PROGRESS NOTES
Assessment/Plan:    No problem-specific Assessment & Plan notes found for this encounter. Diagnoses and all orders for this visit:    Hypercholesterolemia  Comments:  marked improvement  Orders:  -     atorvastatin (LIPITOR) 40 mg tablet; Take 1 tablet (40 mg total) by mouth daily  -     Lipid panel; Future  -     Comprehensive metabolic panel; Future    Class 1 obesity due to excess calories with serious comorbidity and body mass index (BMI) of 32.0 to 32.9 in adult  Comments:  pt counseled on diet and exercise    Encounter for immunization  -     influenza vaccine, quadrivalent, 0.5 mL, preservative-free, for adult and pediatric patients 6 mos+ (AFLURIA, FLUARIX, FLULAVAL, FLUZONE)          PHQ-2/9 Depression Screening    Little interest or pleasure in doing things: 0 - not at all  Feeling down, depressed, or hopeless: 0 - not at all  PHQ-2 Score: 0  PHQ-2 Interpretation: Negative depression screen            Subjective:      Patient ID: Elayne Boyd is a 37 y.o. male. Hyperlipidemia  This is a chronic problem. The current episode started more than 1 year ago. The problem is uncontrolled. Recent lipid tests were reviewed and are high. Factors aggravating his hyperlipidemia include fatty foods. Pertinent negatives include no chest pain or myalgias. Current antihyperlipidemic treatment includes statins. The current treatment provides significant improvement of lipids. Compliance problems include adherence to diet and adherence to exercise. Risk factors for coronary artery disease include male sex, obesity and a sedentary lifestyle. The following portions of the patient's history were reviewed and updated as appropriate: allergies, current medications, past family history, past medical history, past social history, past surgical history, and problem list.    Review of Systems   Constitutional:  Negative for fatigue. Cardiovascular:  Negative for chest pain and palpitations. Gastrointestinal:  Negative for abdominal pain, nausea and vomiting. Musculoskeletal:  Negative for myalgias. Objective:    /78   Pulse (!) 117   Temp 98.4 °F (36.9 °C)   Ht 6' 1" (1.854 m)   Wt 111 kg (245 lb)   SpO2 97%   BMI 32.32 kg/m²      Physical Exam  Vitals and nursing note reviewed. Constitutional:       General: He is not in acute distress. Appearance: Normal appearance. He is not ill-appearing, toxic-appearing or diaphoretic. HENT:      Head: Normocephalic and atraumatic. Eyes:      Conjunctiva/sclera: Conjunctivae normal.   Cardiovascular:      Rate and Rhythm: Normal rate and regular rhythm. Heart sounds: Normal heart sounds. No murmur heard. Pulmonary:      Effort: Pulmonary effort is normal. No respiratory distress. Breath sounds: Normal breath sounds. No wheezing, rhonchi or rales. Abdominal:      General: There is no distension. Palpations: Abdomen is soft. There is no mass. Tenderness: There is no abdominal tenderness. There is no guarding or rebound. Musculoskeletal:      Cervical back: Normal range of motion and neck supple. No rigidity. Right lower leg: No edema. Left lower leg: No edema. Lymphadenopathy:      Cervical: No cervical adenopathy. Skin:     General: Skin is warm and dry. Neurological:      Mental Status: He is alert and oriented to person, place, and time. Psychiatric:         Mood and Affect: Mood normal.         Behavior: Behavior normal.         Thought Content:  Thought content normal.         Judgment: Judgment normal.

## 2024-01-09 ENCOUNTER — APPOINTMENT (OUTPATIENT)
Dept: LAB | Facility: HOSPITAL | Age: 44
End: 2024-01-09
Payer: COMMERCIAL

## 2024-01-09 DIAGNOSIS — E78.00 HYPERCHOLESTEROLEMIA: ICD-10-CM

## 2024-01-09 LAB
ALBUMIN SERPL BCP-MCNC: 4.4 G/DL (ref 3.5–5)
ALP SERPL-CCNC: 111 U/L (ref 34–104)
ALT SERPL W P-5'-P-CCNC: 30 U/L (ref 7–52)
ANION GAP SERPL CALCULATED.3IONS-SCNC: 8 MMOL/L
AST SERPL W P-5'-P-CCNC: 23 U/L (ref 13–39)
BILIRUB SERPL-MCNC: 0.68 MG/DL (ref 0.2–1)
BUN SERPL-MCNC: 11 MG/DL (ref 5–25)
CALCIUM SERPL-MCNC: 9.7 MG/DL (ref 8.4–10.2)
CHLORIDE SERPL-SCNC: 103 MMOL/L (ref 96–108)
CHOLEST SERPL-MCNC: 310 MG/DL
CO2 SERPL-SCNC: 27 MMOL/L (ref 21–32)
CREAT SERPL-MCNC: 0.82 MG/DL (ref 0.6–1.3)
GFR SERPL CREATININE-BSD FRML MDRD: 106 ML/MIN/1.73SQ M
GLUCOSE P FAST SERPL-MCNC: 100 MG/DL (ref 65–99)
HDLC SERPL-MCNC: 66 MG/DL
LDLC SERPL CALC-MCNC: 220 MG/DL (ref 0–100)
NONHDLC SERPL-MCNC: 244 MG/DL
POTASSIUM SERPL-SCNC: 4.3 MMOL/L (ref 3.5–5.3)
PROT SERPL-MCNC: 7.6 G/DL (ref 6.4–8.4)
SODIUM SERPL-SCNC: 138 MMOL/L (ref 135–147)
TRIGL SERPL-MCNC: 121 MG/DL

## 2024-01-09 PROCEDURE — 36415 COLL VENOUS BLD VENIPUNCTURE: CPT

## 2024-01-09 PROCEDURE — 80053 COMPREHEN METABOLIC PANEL: CPT

## 2024-01-09 PROCEDURE — 80061 LIPID PANEL: CPT

## 2024-01-12 ENCOUNTER — OFFICE VISIT (OUTPATIENT)
Dept: FAMILY MEDICINE CLINIC | Facility: CLINIC | Age: 44
End: 2024-01-12
Payer: COMMERCIAL

## 2024-01-12 VITALS
TEMPERATURE: 98.1 F | HEIGHT: 72 IN | SYSTOLIC BLOOD PRESSURE: 128 MMHG | WEIGHT: 253.2 LBS | HEART RATE: 100 BPM | BODY MASS INDEX: 34.29 KG/M2 | OXYGEN SATURATION: 97 % | DIASTOLIC BLOOD PRESSURE: 82 MMHG

## 2024-01-12 DIAGNOSIS — E66.09 CLASS 1 OBESITY DUE TO EXCESS CALORIES WITH SERIOUS COMORBIDITY AND BODY MASS INDEX (BMI) OF 32.0 TO 32.9 IN ADULT: ICD-10-CM

## 2024-01-12 DIAGNOSIS — E78.00 HYPERCHOLESTEROLEMIA: Primary | ICD-10-CM

## 2024-01-12 PROCEDURE — 99213 OFFICE O/P EST LOW 20 MIN: CPT | Performed by: FAMILY MEDICINE

## 2024-01-12 RX ORDER — SIMVASTATIN 20 MG
20 TABLET ORAL
Qty: 30 TABLET | Refills: 6 | Status: SHIPPED | OUTPATIENT
Start: 2024-01-12

## 2024-01-12 NOTE — PROGRESS NOTES
Assessment/Plan:    No problem-specific Assessment & Plan notes found for this encounter.       Diagnoses and all orders for this visit:    Hypercholesterolemia  Comments:  pt counseled on diet and exercise  Orders:  -     simvastatin (ZOCOR) 20 mg tablet; Take 1 tablet (20 mg total) by mouth daily at bedtime  -     Lipid panel; Future  -     Comprehensive metabolic panel; Future    Class 1 obesity due to excess calories with serious comorbidity and body mass index (BMI) of 32.0 to 32.9 in adult  Comments:  pt counseled on diet and exercise          PHQ-2/9 Depression Screening    Little interest or pleasure in doing things: 0 - not at all  Feeling down, depressed, or hopeless: 0 - not at all  PHQ-2 Score: 0  PHQ-2 Interpretation: Negative depression screen            Subjective:      Patient ID: Servando Aviles is a 43 y.o. male.    Hyperlipidemia  This is a chronic problem. The current episode started more than 1 year ago. The problem is uncontrolled. Recent lipid tests were reviewed and are high. Factors aggravating his hyperlipidemia include fatty foods. Pertinent negatives include no chest pain or myalgias.       The following portions of the patient's history were reviewed and updated as appropriate: allergies, current medications, past family history, past medical history, past social history, past surgical history, and problem list.    Review of Systems   Cardiovascular:  Negative for chest pain and palpitations.   Gastrointestinal:  Negative for abdominal pain, nausea and vomiting.   Musculoskeletal:  Negative for myalgias.         Objective:    /82 (BP Location: Left arm, Patient Position: Sitting, Cuff Size: Standard)   Pulse 100   Temp 98.1 °F (36.7 °C) (Tympanic)   Ht 6' (1.829 m)   Wt 115 kg (253 lb 3.2 oz)   SpO2 97%   BMI 34.34 kg/m²      Physical Exam  Vitals and nursing note reviewed.   Constitutional:       General: He is not in acute distress.     Appearance: Normal appearance. He  is not ill-appearing, toxic-appearing or diaphoretic.   HENT:      Head: Normocephalic and atraumatic.   Eyes:      Conjunctiva/sclera: Conjunctivae normal.   Cardiovascular:      Rate and Rhythm: Normal rate and regular rhythm.      Heart sounds: Normal heart sounds. No murmur heard.  Pulmonary:      Effort: Pulmonary effort is normal. No respiratory distress.      Breath sounds: Normal breath sounds. No wheezing, rhonchi or rales.   Abdominal:      General: There is no distension.      Palpations: Abdomen is soft. There is no mass.      Tenderness: There is no abdominal tenderness. There is no guarding or rebound.   Musculoskeletal:      Cervical back: Normal range of motion and neck supple. No rigidity.      Right lower leg: No edema.      Left lower leg: No edema.   Lymphadenopathy:      Cervical: No cervical adenopathy.   Skin:     General: Skin is warm and dry.   Neurological:      Mental Status: He is alert and oriented to person, place, and time.   Psychiatric:         Mood and Affect: Mood normal.         Behavior: Behavior normal.         Thought Content: Thought content normal.         Judgment: Judgment normal.

## 2024-03-13 ENCOUNTER — APPOINTMENT (OUTPATIENT)
Dept: LAB | Facility: HOSPITAL | Age: 44
End: 2024-03-13
Payer: COMMERCIAL

## 2024-03-13 DIAGNOSIS — E78.00 HYPERCHOLESTEROLEMIA: ICD-10-CM

## 2024-03-13 LAB
ALBUMIN SERPL BCP-MCNC: 4.6 G/DL (ref 3.5–5)
ALP SERPL-CCNC: 100 U/L (ref 34–104)
ALT SERPL W P-5'-P-CCNC: 37 U/L (ref 7–52)
ANION GAP SERPL CALCULATED.3IONS-SCNC: 10 MMOL/L (ref 4–13)
AST SERPL W P-5'-P-CCNC: 26 U/L (ref 13–39)
BILIRUB SERPL-MCNC: 0.64 MG/DL (ref 0.2–1)
BUN SERPL-MCNC: 12 MG/DL (ref 5–25)
CALCIUM SERPL-MCNC: 9.9 MG/DL (ref 8.4–10.2)
CHLORIDE SERPL-SCNC: 103 MMOL/L (ref 96–108)
CHOLEST SERPL-MCNC: 351 MG/DL
CO2 SERPL-SCNC: 27 MMOL/L (ref 21–32)
CREAT SERPL-MCNC: 0.87 MG/DL (ref 0.6–1.3)
GFR SERPL CREATININE-BSD FRML MDRD: 104 ML/MIN/1.73SQ M
GLUCOSE P FAST SERPL-MCNC: 100 MG/DL (ref 65–99)
HDLC SERPL-MCNC: 79 MG/DL
LDLC SERPL CALC-MCNC: 249 MG/DL (ref 0–100)
NONHDLC SERPL-MCNC: 272 MG/DL
POTASSIUM SERPL-SCNC: 4.4 MMOL/L (ref 3.5–5.3)
PROT SERPL-MCNC: 7.6 G/DL (ref 6.4–8.4)
SODIUM SERPL-SCNC: 140 MMOL/L (ref 135–147)
TRIGL SERPL-MCNC: 113 MG/DL

## 2024-03-13 PROCEDURE — 80053 COMPREHEN METABOLIC PANEL: CPT

## 2024-03-13 PROCEDURE — 36415 COLL VENOUS BLD VENIPUNCTURE: CPT

## 2024-03-13 PROCEDURE — 80061 LIPID PANEL: CPT

## 2024-03-15 ENCOUNTER — OFFICE VISIT (OUTPATIENT)
Dept: FAMILY MEDICINE CLINIC | Facility: CLINIC | Age: 44
End: 2024-03-15
Payer: COMMERCIAL

## 2024-03-15 VITALS
HEART RATE: 97 BPM | OXYGEN SATURATION: 97 % | DIASTOLIC BLOOD PRESSURE: 84 MMHG | WEIGHT: 251.6 LBS | HEIGHT: 72 IN | SYSTOLIC BLOOD PRESSURE: 138 MMHG | TEMPERATURE: 97.5 F | BODY MASS INDEX: 34.08 KG/M2

## 2024-03-15 DIAGNOSIS — E78.00 HYPERCHOLESTEROLEMIA: Primary | ICD-10-CM

## 2024-03-15 DIAGNOSIS — Z12.11 SCREENING FOR COLON CANCER: ICD-10-CM

## 2024-03-15 PROCEDURE — 99213 OFFICE O/P EST LOW 20 MIN: CPT | Performed by: FAMILY MEDICINE

## 2024-03-15 RX ORDER — SIMVASTATIN 40 MG
40 TABLET ORAL
Qty: 30 TABLET | Refills: 6 | Status: SHIPPED | OUTPATIENT
Start: 2024-03-15

## 2024-03-15 NOTE — PROGRESS NOTES
Assessment/Plan:    No problem-specific Assessment & Plan notes found for this encounter.       Diagnoses and all orders for this visit:    Hypercholesterolemia  -     simvastatin (ZOCOR) 40 mg tablet; Take 1 tablet (40 mg total) by mouth daily at bedtime    Screening for colon cancer  -     Ambulatory Referral to Gastroenterology; Future          PHQ-2/9 Depression Screening    Little interest or pleasure in doing things: 0 - not at all  Feeling down, depressed, or hopeless: 0 - not at all  PHQ-2 Score: 0  PHQ-2 Interpretation: Negative depression screen            Subjective:      Patient ID: Servando Aviles is a 43 y.o. male.    Hyperlipidemia  This is a chronic problem. The current episode started more than 1 year ago. The problem is uncontrolled. Recent lipid tests were reviewed and are high. Factors aggravating his hyperlipidemia include fatty foods. Pertinent negatives include no chest pain, myalgias or shortness of breath. Current antihyperlipidemic treatment includes statins. The current treatment provides moderate improvement of lipids.       The following portions of the patient's history were reviewed and updated as appropriate: allergies, current medications, past family history, past medical history, past social history, past surgical history, and problem list.    Review of Systems   Constitutional:  Negative for fatigue.   Respiratory:  Negative for shortness of breath and wheezing.    Cardiovascular:  Negative for chest pain and palpitations.   Gastrointestinal:  Negative for abdominal pain, nausea and vomiting.   Musculoskeletal:  Negative for myalgias.         Objective:    /84 (BP Location: Left arm, Patient Position: Sitting)   Pulse 97   Temp 97.5 °F (36.4 °C) (Tympanic)   Ht 6' (1.829 m)   Wt 114 kg (251 lb 9.6 oz)   SpO2 97%   BMI 34.12 kg/m²      Physical Exam  Vitals and nursing note reviewed.   Constitutional:       General: He is not in acute distress.     Appearance:  Normal appearance. He is not ill-appearing, toxic-appearing or diaphoretic.   HENT:      Head: Normocephalic and atraumatic.   Eyes:      Conjunctiva/sclera: Conjunctivae normal.   Cardiovascular:      Rate and Rhythm: Normal rate and regular rhythm.      Heart sounds: Normal heart sounds. No murmur heard.  Pulmonary:      Effort: Pulmonary effort is normal. No respiratory distress.      Breath sounds: Normal breath sounds. No wheezing, rhonchi or rales.   Abdominal:      General: There is no distension.      Palpations: Abdomen is soft. There is no mass.      Tenderness: There is no abdominal tenderness. There is no guarding or rebound.   Musculoskeletal:      Cervical back: Normal range of motion and neck supple. No rigidity.      Right lower leg: No edema.      Left lower leg: No edema.   Lymphadenopathy:      Cervical: No cervical adenopathy.   Skin:     General: Skin is warm and dry.   Neurological:      Mental Status: He is alert and oriented to person, place, and time.   Psychiatric:         Mood and Affect: Mood normal.         Behavior: Behavior normal.         Thought Content: Thought content normal.         Judgment: Judgment normal.

## 2024-04-22 ENCOUNTER — PREP FOR PROCEDURE (OUTPATIENT)
Age: 44
End: 2024-04-22

## 2024-04-22 ENCOUNTER — TELEPHONE (OUTPATIENT)
Age: 44
End: 2024-04-22

## 2024-04-22 DIAGNOSIS — Z12.11 SCREENING FOR COLON CANCER: Primary | ICD-10-CM

## 2024-04-22 NOTE — TELEPHONE ENCOUNTER
04/22/24  Screened by: Shu Reed    Referring Provider     Pre- Screening:     There is no height or weight on file to calculate BMI.  Has patient been referred for a routine screening Colonoscopy? yes  Is the patient between 45-75 years old? yes      Previous Colonoscopy no   If yes:    Date: N/A    Facility: N/A    Reason: N/A          Does the patient want to see a Gastroenterologist prior to their procedure OR are they having any GI symptoms? no    Has the patient been hospitalized or had abdominal surgery in the past 6 months? no    Does the patient use supplemental oxygen? no    Does the patient take Coumadin, Lovenox, Plavix, Elliquis, Xarelto, or other blood thinning medication? no    Has the patient had a stroke, cardiac event, or stent placed in the past year? no        If patient is between 45yrs - 49yrs, please advise patient that we will have to confirm benefits & coverage with their insurance company for a routine screening colonoscopy.

## 2024-04-22 NOTE — TELEPHONE ENCOUNTER
Scheduled date of colonoscopy (as of today):6/21/24  Physician performing colonoscopy:  Location of colonoscopy: [facility]  Bowel prep reviewed with patient: Shu Reed  Instructions reviewed with patient by: Shu Reed  Clearances: N/A      Miralax Dulcolax prep sent via [facility]

## 2024-05-14 ENCOUNTER — APPOINTMENT (OUTPATIENT)
Dept: LAB | Facility: HOSPITAL | Age: 44
End: 2024-05-14
Payer: COMMERCIAL

## 2024-05-14 DIAGNOSIS — E78.00 HYPERCHOLESTEROLEMIA: ICD-10-CM

## 2024-05-14 LAB
ALBUMIN SERPL BCP-MCNC: 4.6 G/DL (ref 3.5–5)
ALP SERPL-CCNC: 106 U/L (ref 34–104)
ALT SERPL W P-5'-P-CCNC: 35 U/L (ref 7–52)
ANION GAP SERPL CALCULATED.3IONS-SCNC: 12 MMOL/L (ref 4–13)
AST SERPL W P-5'-P-CCNC: 28 U/L (ref 13–39)
BILIRUB SERPL-MCNC: 0.52 MG/DL (ref 0.2–1)
BUN SERPL-MCNC: 13 MG/DL (ref 5–25)
CALCIUM SERPL-MCNC: 9.9 MG/DL (ref 8.4–10.2)
CHLORIDE SERPL-SCNC: 102 MMOL/L (ref 96–108)
CHOLEST SERPL-MCNC: 328 MG/DL
CO2 SERPL-SCNC: 24 MMOL/L (ref 21–32)
CREAT SERPL-MCNC: 0.86 MG/DL (ref 0.6–1.3)
GFR SERPL CREATININE-BSD FRML MDRD: 104 ML/MIN/1.73SQ M
GLUCOSE P FAST SERPL-MCNC: 91 MG/DL (ref 65–99)
HDLC SERPL-MCNC: 76 MG/DL
LDLC SERPL CALC-MCNC: 209 MG/DL (ref 0–100)
NONHDLC SERPL-MCNC: 252 MG/DL
POTASSIUM SERPL-SCNC: 4 MMOL/L (ref 3.5–5.3)
PROT SERPL-MCNC: 7.7 G/DL (ref 6.4–8.4)
SODIUM SERPL-SCNC: 138 MMOL/L (ref 135–147)
TRIGL SERPL-MCNC: 213 MG/DL

## 2024-05-14 PROCEDURE — 80061 LIPID PANEL: CPT

## 2024-05-14 PROCEDURE — 80053 COMPREHEN METABOLIC PANEL: CPT

## 2024-05-14 PROCEDURE — 36415 COLL VENOUS BLD VENIPUNCTURE: CPT

## 2024-05-17 ENCOUNTER — OFFICE VISIT (OUTPATIENT)
Dept: FAMILY MEDICINE CLINIC | Facility: CLINIC | Age: 44
End: 2024-05-17
Payer: COMMERCIAL

## 2024-05-17 VITALS
TEMPERATURE: 98 F | OXYGEN SATURATION: 98 % | DIASTOLIC BLOOD PRESSURE: 82 MMHG | BODY MASS INDEX: 33.53 KG/M2 | HEART RATE: 108 BPM | HEIGHT: 73 IN | WEIGHT: 253 LBS | SYSTOLIC BLOOD PRESSURE: 138 MMHG

## 2024-05-17 DIAGNOSIS — E78.00 HYPERCHOLESTEROLEMIA: Primary | ICD-10-CM

## 2024-05-17 DIAGNOSIS — I10 ESSENTIAL HYPERTENSION: ICD-10-CM

## 2024-05-17 PROCEDURE — 99213 OFFICE O/P EST LOW 20 MIN: CPT | Performed by: FAMILY MEDICINE

## 2024-05-17 RX ORDER — SIMVASTATIN 80 MG
80 TABLET ORAL
Qty: 30 TABLET | Refills: 6 | Status: SHIPPED | OUTPATIENT
Start: 2024-05-17

## 2024-05-17 NOTE — PROGRESS NOTES
"Assessment/Plan:    No problem-specific Assessment & Plan notes found for this encounter.       Diagnoses and all orders for this visit:    Hypercholesterolemia  -     simvastatin (ZOCOR) 80 mg tablet; Take 1 tablet (80 mg total) by mouth daily at bedtime  -     Lipid panel; Future  -     Comprehensive metabolic panel; Future    Essential hypertension  -     CBC and differential; Future  -     TSH, 3rd generation with Free T4 reflex; Future          PHQ-2/9 Depression Screening    Little interest or pleasure in doing things: 0 - not at all  Feeling down, depressed, or hopeless: 0 - not at all  PHQ-2 Score: 0  PHQ-2 Interpretation: Negative depression screen            Subjective:      Patient ID: Servando Aviles is a 43 y.o. male.    Pt is exercising regularly but admits to a poor diet with junk food    Hyperlipidemia  This is a chronic problem. The current episode started more than 1 year ago. The problem is uncontrolled. Recent lipid tests were reviewed and are high. Factors aggravating his hyperlipidemia include fatty foods. Pertinent negatives include no chest pain or myalgias. Current antihyperlipidemic treatment includes statins. The current treatment provides mild improvement of lipids. Compliance problems include adherence to diet and adherence to exercise.  Risk factors for coronary artery disease include a sedentary lifestyle, dyslipidemia, male sex and obesity.       The following portions of the patient's history were reviewed and updated as appropriate: allergies, current medications, past family history, past medical history, past social history, past surgical history, and problem list.    Review of Systems   Cardiovascular:  Negative for chest pain and palpitations.   Gastrointestinal:  Negative for abdominal pain, nausea and vomiting.   Musculoskeletal:  Negative for myalgias.         Objective:    /82   Pulse (!) 108   Temp 98 °F (36.7 °C)   Ht 6' 1\" (1.854 m)   Wt 115 kg (253 lb)   " SpO2 98%   BMI 33.38 kg/m²      Physical Exam  Vitals and nursing note reviewed.   Constitutional:       General: He is not in acute distress.     Appearance: Normal appearance. He is not ill-appearing, toxic-appearing or diaphoretic.   HENT:      Head: Normocephalic and atraumatic.   Eyes:      Conjunctiva/sclera: Conjunctivae normal.   Cardiovascular:      Rate and Rhythm: Normal rate and regular rhythm.      Heart sounds: Normal heart sounds. No murmur heard.  Pulmonary:      Effort: Pulmonary effort is normal. No respiratory distress.      Breath sounds: Normal breath sounds. No wheezing, rhonchi or rales.   Abdominal:      General: There is no distension.      Palpations: Abdomen is soft. There is no mass.      Tenderness: There is no abdominal tenderness. There is no guarding or rebound.   Musculoskeletal:      Cervical back: Normal range of motion and neck supple. No rigidity.      Right lower leg: No edema.      Left lower leg: No edema.   Lymphadenopathy:      Cervical: No cervical adenopathy.   Skin:     General: Skin is warm and dry.   Neurological:      Mental Status: He is alert and oriented to person, place, and time.   Psychiatric:         Mood and Affect: Mood normal.         Behavior: Behavior normal.         Thought Content: Thought content normal.         Judgment: Judgment normal.

## 2024-06-03 ENCOUNTER — CONSULT (OUTPATIENT)
Dept: CARDIOLOGY CLINIC | Facility: CLINIC | Age: 44
End: 2024-06-03
Payer: COMMERCIAL

## 2024-06-03 VITALS
DIASTOLIC BLOOD PRESSURE: 90 MMHG | WEIGHT: 255.2 LBS | BODY MASS INDEX: 33.82 KG/M2 | HEART RATE: 98 BPM | HEIGHT: 73 IN | SYSTOLIC BLOOD PRESSURE: 142 MMHG

## 2024-06-03 DIAGNOSIS — E66.09 CLASS 1 OBESITY DUE TO EXCESS CALORIES WITHOUT SERIOUS COMORBIDITY WITH BODY MASS INDEX (BMI) OF 33.0 TO 33.9 IN ADULT: ICD-10-CM

## 2024-06-03 DIAGNOSIS — E78.49 FAMILIAL HYPERLIPIDEMIA: Primary | ICD-10-CM

## 2024-06-03 DIAGNOSIS — R03.0 ELEVATED BP WITHOUT DIAGNOSIS OF HYPERTENSION: ICD-10-CM

## 2024-06-03 DIAGNOSIS — Z82.49 FAMILY HISTORY OF MI (MYOCARDIAL INFARCTION): ICD-10-CM

## 2024-06-03 PROCEDURE — 99204 OFFICE O/P NEW MOD 45 MIN: CPT | Performed by: INTERNAL MEDICINE

## 2024-06-03 PROCEDURE — 93000 ELECTROCARDIOGRAM COMPLETE: CPT | Performed by: INTERNAL MEDICINE

## 2024-06-03 RX ORDER — ROSUVASTATIN CALCIUM 40 MG/1
40 TABLET, COATED ORAL DAILY
Qty: 90 TABLET | Refills: 1 | Status: SHIPPED | OUTPATIENT
Start: 2024-06-03

## 2024-06-03 NOTE — PROGRESS NOTES
[facility] [place]   [address] [facility] [address]                                            Cardiology Office Consult  Servando Aviles, 44 y.o. male  YOB: 1980  MRN: [id number] Encounter: [id number]      PCP - Justino Moncada DO  Referring Provider - Justino Moncada*    Chief Complaint   Patient presents with   • New Patient Visit     Hypercholesterolemia        Assessment  Familial hyperlipidemia   Family history of heart attack  Obesity, Body mass index is 33.67 kg/m².   2014 - was 190 lbs (with exertion)    Plan  Familial hyperlipidemia, Family h/o heart attack    Lipid therapy so far through PCP  7/2023 - started on atorvastatin 20  10/2023 - atorvastatin increased to 40  1/2024: atorvastatin 40 switched to simvastatin 20  3/2024: simvastatin increased to 40 mg daily  5/14/2024: simvastatin increased to 80 mg daily  6/3/24: initial OV with me  No problems with statins so far, asymptomatic  Impression  Likely has heterozygous familial hyperlipidemia (definite familial hyperlipidemia by Burmese criteria for FH)  Inadequate control with statin therapy so far  Plan  Recommend switching to highest intensity statin = rosuvastatin 40 mg daily  He may still not be able to achieve adequate control with this, and may need combination therapy with ezetimibe  Follow up lipid panel, CMP in 6 weeks  If inadequate control, plan to add ezetimibe 10 mg daily  With his family h/o heart attack, I recommend further cardiac risk stratification with CT coronary calcium score to determine prevailing degree of underlying CAD  Dietary modifications, weight loss recommended  Regular cardio-exercises for 30 minutes x 5 days/week at least is recommended      Obesity - Body mass index is 33.67 kg/m².   His weight was down to 190 pounds back in 2014 with exercise and dietary changes, but subsequently with sedentary state at work, he has gained a lot of  weight  Recently he has restarted exercise and is working on weight loss  Dietary modifications, weight loss recommended    Results for orders placed or performed in visit on 24   POCT ECG    Impression    Normal sinus rhythm  Normal ECG       Orders Placed This Encounter   Procedures   • CT coronary calcium score   • Lipid Panel with Direct LDL reflex   • Comprehensive metabolic panel   • POCT ECG     Return in about 2 months (around 8/3/2024), or if symptoms worsen or fail to improve.      History of Present Illness   44 y.o. male , who works as an  at a chemical company, comes in as a new patient for consultation regarding uncontrolled cholesterol levels despite medical therapy, after being referred by PCP.    He reports to me that he has had longstanding history of hyperlipidemia and he was first found to have high cholesterol levels when he was in high school.  He was monitored thereafter, but then subsequently he did not follow through.  In  his weight was down with a lot of exercise and he was continued to do well.  Subsequently he got promoted, and was doing more sedentary work.    Family history  Maternal Grandfather - obesity,  of heart attack at 44  Father - alive at 76, good health, no major medical problems  Mother - HLD, no known CAD or stroke, alive at 75  Siblings: 1 older brother (50), 1 older sister (48)  Brother - Hyperlipidemia since young age, no known CAD  Sister - unknown medical issues (lives in [place])  Children - 2 girls, 1 boy  1 daughter 20 - no major medical problems    Historical Information   Past Medical History:   Diagnosis Date   • Hypercholesteremia    • Hyperlipidemia      Past Surgical History:   Procedure Laterality Date   • WISDOM TOOTH EXTRACTION       Family History   Problem Relation Age of Onset   • Hyperlipidemia Mother    • No Known Problems Father    • Heart attack Maternal Grandfather      Current Outpatient Medications   Medication  "Instructions   • rosuvastatin (CRESTOR) 40 mg, Oral, Daily      No Known Allergies  Social History     Socioeconomic History   • Marital status: /Civil Union     Spouse name: None   • Number of children: None   • Years of education: None   • Highest education level: None   Occupational History   • None   Tobacco Use   • Smoking status: Never     Passive exposure: Never   • Smokeless tobacco: Never   Vaping Use   • Vaping status: Never Used   Substance and Sexual Activity   • Alcohol use: Yes     Alcohol/week: 17.0 standard drinks of alcohol     Types: 2 Cans of beer, 15 Shots of liquor per week   • Drug use: Never   • Sexual activity: Yes     Partners: Female     Birth control/protection: Coitus interruptus   Other Topics Concern   • None   Social History Narrative   • None     Social Determinants of Health     Financial Resource Strain: Not on file   Food Insecurity: Not on file   Transportation Needs: Not on file   Physical Activity: Not on file   Stress: Not on file   Social Connections: Not on file   Intimate Partner Violence: Not on file   Housing Stability: Not on file        Review of Systems   All other systems reviewed and are negative.        Vitals:  Vitals:    06/03/24 1339   BP: 142/90   Pulse: 98   Weight: 116 kg (255 lb 3.2 oz)   Height: 6' 1\" (1.854 m)     BMI - Body mass index is 33.67 kg/m².  Wt Readings from Last 7 Encounters:   06/03/24 116 kg (255 lb 3.2 oz)   05/17/24 115 kg (253 lb)   03/15/24 114 kg (251 lb 9.6 oz)   01/12/24 115 kg (253 lb 3.2 oz)   10/12/23 111 kg (245 lb)   08/03/23 111 kg (245 lb)   07/10/23 115 kg (253 lb 6.4 oz)       Physical Exam  Vitals and nursing note reviewed.   Constitutional:       General: He is not in acute distress.     Appearance: Normal appearance. He is well-developed. He is obese. He is not ill-appearing or diaphoretic.   HENT:      Head: Normocephalic and atraumatic.      Nose: No congestion.   Eyes:      General: No scleral icterus.     " "Conjunctiva/sclera: Conjunctivae normal.   Neck:      Vascular: No carotid bruit or JVD.   Cardiovascular:      Rate and Rhythm: Normal rate and regular rhythm.      Heart sounds: Normal heart sounds. No murmur heard.     No friction rub. No gallop.   Pulmonary:      Effort: Pulmonary effort is normal. No respiratory distress.      Breath sounds: Normal breath sounds. No wheezing or rales.   Chest:      Chest wall: No tenderness.   Abdominal:      General: There is no distension.      Palpations: Abdomen is soft.      Tenderness: There is no abdominal tenderness.   Musculoskeletal:         General: No swelling, tenderness or deformity.      Cervical back: Neck supple. No muscular tenderness.      Right lower leg: No edema.      Left lower leg: No edema.   Skin:     General: Skin is warm.   Neurological:      General: No focal deficit present.      Mental Status: He is alert and oriented to person, place, and time. Mental status is at baseline.   Psychiatric:         Mood and Affect: Mood normal.         Behavior: Behavior normal.         Thought Content: Thought content normal.           Labs:  CBC:   Lab Results   Component Value Date    WBC 5.52 07/19/2023    RBC 4.95 07/19/2023    HGB 16.0 07/19/2023    HCT 46.8 07/19/2023    MCV 95 07/19/2023     07/19/2023    RDW 12.2 07/19/2023       CMP:   Lab Results   Component Value Date    K 4.0 05/14/2024     05/14/2024    CO2 24 05/14/2024    BUN 13 05/14/2024    CREATININE 0.86 05/14/2024    EGFR 104 05/14/2024    CALCIUM 9.9 05/14/2024    AST 28 05/14/2024    ALT 35 05/14/2024    ALKPHOS 106 (H) 05/14/2024       Magnesium:  No results found for: \"MG\"    Lipid Profile:   Lab Results   Component Value Date    HDL 76 05/14/2024    TRIG 213 (H) 05/14/2024    LDLCALC 209 (H) 05/14/2024       Thyroid Studies:   Lab Results   Component Value Date    TIK4MKNCPJMI 4.359 07/19/2023       A1c:  No components found for: \"HGA1C\"    INR:  No results found for: " "\"INR\"5    Cardiac testing:   No results found for this or any previous visit.    No results found for this or any previous visit.    No results found for this or any previous visit.    No results found for this or any previous visit.      XR hand 3+ vw left  Narrative: LEFT HAND    INDICATION:   M79.645: Pain in left finger(s).    COMPARISON:  None    VIEWS:  XR HAND 3+ VW LEFT  Images: 3    For the purposes of institution wide universal language the following terms will apply: (thumb=1st digit/finger, index finger=2nd digit/finger, long finger=3rd digit/finger, ring=4th digit/finger and small finger=5th digit/finger)    FINDINGS:    There is no acute fracture or dislocation.    No significant degenerative changes.    No lytic or blastic osseous lesion.    Soft tissues are unremarkable.  Impression: No acute osseous abnormality.    Workstation performed: [id number]         "

## 2024-06-21 ENCOUNTER — HOSPITAL ENCOUNTER (OUTPATIENT)
Dept: PERIOP | Facility: HOSPITAL | Age: 44
Setting detail: OUTPATIENT SURGERY
End: 2024-06-21
Attending: STUDENT IN AN ORGANIZED HEALTH CARE EDUCATION/TRAINING PROGRAM
Payer: COMMERCIAL

## 2024-06-21 ENCOUNTER — ANESTHESIA EVENT (OUTPATIENT)
Dept: PERIOP | Facility: HOSPITAL | Age: 44
End: 2024-06-21

## 2024-06-21 ENCOUNTER — ANESTHESIA (OUTPATIENT)
Dept: PERIOP | Facility: HOSPITAL | Age: 44
End: 2024-06-21

## 2024-06-21 VITALS
HEART RATE: 80 BPM | BODY MASS INDEX: 33.18 KG/M2 | RESPIRATION RATE: 18 BRPM | SYSTOLIC BLOOD PRESSURE: 153 MMHG | WEIGHT: 245 LBS | TEMPERATURE: 97.8 F | DIASTOLIC BLOOD PRESSURE: 93 MMHG | HEIGHT: 72 IN | OXYGEN SATURATION: 97 %

## 2024-06-21 DIAGNOSIS — Z12.11 SCREENING FOR COLON CANCER: ICD-10-CM

## 2024-06-21 PROCEDURE — 88305 TISSUE EXAM BY PATHOLOGIST: CPT | Performed by: PATHOLOGY

## 2024-06-21 PROCEDURE — 45385 COLONOSCOPY W/LESION REMOVAL: CPT | Performed by: STUDENT IN AN ORGANIZED HEALTH CARE EDUCATION/TRAINING PROGRAM

## 2024-06-21 RX ORDER — ALBUTEROL SULFATE 2.5 MG/3ML
2.5 SOLUTION RESPIRATORY (INHALATION) ONCE AS NEEDED
Status: CANCELLED | OUTPATIENT
Start: 2024-06-21

## 2024-06-21 RX ORDER — ONDANSETRON 2 MG/ML
4 INJECTION INTRAMUSCULAR; INTRAVENOUS ONCE AS NEEDED
Status: DISCONTINUED | OUTPATIENT
Start: 2024-06-21 | End: 2024-06-25 | Stop reason: HOSPADM

## 2024-06-21 RX ORDER — ONDANSETRON 2 MG/ML
4 INJECTION INTRAMUSCULAR; INTRAVENOUS ONCE AS NEEDED
Status: CANCELLED | OUTPATIENT
Start: 2024-06-21

## 2024-06-21 RX ORDER — LIDOCAINE HYDROCHLORIDE 20 MG/ML
INJECTION, SOLUTION EPIDURAL; INFILTRATION; INTRACAUDAL; PERINEURAL AS NEEDED
Status: DISCONTINUED | OUTPATIENT
Start: 2024-06-21 | End: 2024-06-21

## 2024-06-21 RX ORDER — PROPOFOL 10 MG/ML
INJECTION, EMULSION INTRAVENOUS AS NEEDED
Status: DISCONTINUED | OUTPATIENT
Start: 2024-06-21 | End: 2024-06-21

## 2024-06-21 RX ORDER — ALBUTEROL SULFATE 2.5 MG/3ML
2.5 SOLUTION RESPIRATORY (INHALATION) ONCE AS NEEDED
Status: DISCONTINUED | OUTPATIENT
Start: 2024-06-21 | End: 2024-06-25 | Stop reason: HOSPADM

## 2024-06-21 RX ORDER — SODIUM CHLORIDE, SODIUM LACTATE, POTASSIUM CHLORIDE, CALCIUM CHLORIDE 600; 310; 30; 20 MG/100ML; MG/100ML; MG/100ML; MG/100ML
100 INJECTION, SOLUTION INTRAVENOUS CONTINUOUS
Status: DISCONTINUED | OUTPATIENT
Start: 2024-06-21 | End: 2024-06-25 | Stop reason: HOSPADM

## 2024-06-21 RX ORDER — SODIUM CHLORIDE, SODIUM LACTATE, POTASSIUM CHLORIDE, CALCIUM CHLORIDE 600; 310; 30; 20 MG/100ML; MG/100ML; MG/100ML; MG/100ML
100 INJECTION, SOLUTION INTRAVENOUS CONTINUOUS
Status: CANCELLED | OUTPATIENT
Start: 2024-06-21

## 2024-06-21 RX ORDER — KETAMINE HYDROCHLORIDE 50 MG/ML
INJECTION, SOLUTION INTRAMUSCULAR; INTRAVENOUS AS NEEDED
Status: DISCONTINUED | OUTPATIENT
Start: 2024-06-21 | End: 2024-06-21

## 2024-06-21 RX ADMIN — PROPOFOL 70 MG: 10 INJECTION, EMULSION INTRAVENOUS at 08:32

## 2024-06-21 RX ADMIN — KETAMINE HYDROCHLORIDE 30 MG: 50 INJECTION, SOLUTION INTRAMUSCULAR; INTRAVENOUS at 08:31

## 2024-06-21 RX ADMIN — PROPOFOL 170 MCG/KG/MIN: 10 INJECTION, EMULSION INTRAVENOUS at 08:30

## 2024-06-21 RX ADMIN — KETAMINE HYDROCHLORIDE 10 MG: 50 INJECTION, SOLUTION INTRAMUSCULAR; INTRAVENOUS at 08:39

## 2024-06-21 RX ADMIN — PROPOFOL 100 MG: 10 INJECTION, EMULSION INTRAVENOUS at 08:31

## 2024-06-21 RX ADMIN — PROPOFOL 40 MG: 10 INJECTION, EMULSION INTRAVENOUS at 08:48

## 2024-06-21 RX ADMIN — KETAMINE HYDROCHLORIDE 10 MG: 50 INJECTION, SOLUTION INTRAMUSCULAR; INTRAVENOUS at 08:37

## 2024-06-21 RX ADMIN — SODIUM CHLORIDE, SODIUM LACTATE, POTASSIUM CHLORIDE, AND CALCIUM CHLORIDE 100 ML/HR: .6; .31; .03; .02 INJECTION, SOLUTION INTRAVENOUS at 07:42

## 2024-06-21 RX ADMIN — PROPOFOL 100 MG: 10 INJECTION, EMULSION INTRAVENOUS at 08:30

## 2024-06-21 RX ADMIN — LIDOCAINE HYDROCHLORIDE 50 MG: 20 INJECTION, SOLUTION EPIDURAL; INFILTRATION; INTRACAUDAL; PERINEURAL at 08:30

## 2024-06-21 NOTE — ANESTHESIA PREPROCEDURE EVALUATION
Procedure:  COLONOSCOPY    Relevant Problems   ANESTHESIA (within normal limits)      CARDIO   (+) Hypercholesterolemia        Physical Exam    Airway    Mallampati score: III  TM Distance: <3 FB  Neck ROM: full     Dental       Cardiovascular  Rate: normal    Pulmonary  Pulmonary exam normal     Other Findings  Per pt denies anything remaining that is loose or removeable      Anesthesia Plan  ASA Score- 2     Anesthesia Type- IV sedation with anesthesia with ASA Monitors.         Additional Monitors:     Airway Plan:     Comment: Per patient, appropriately NPO, denies active CP/SOB/wheezing/symptoms related to heartburn/nausea/vomiting  .       Plan Factors-Exercise tolerance (METS): >4 METS.    Chart reviewed.    Patient summary reviewed.    Patient is not a current smoker.              Induction- intravenous.    Postoperative Plan-         Informed Consent- Anesthetic plan and risks discussed with patient.  I personally reviewed this patient with the CRNA. Discussed and agreed on the Anesthesia Plan with the CRNA..

## 2024-06-21 NOTE — ANESTHESIA POSTPROCEDURE EVALUATION
Post-Op Assessment Note    CV Status:  Stable  Pain Score: 0    Pain management: adequate       Mental Status:  Alert and awake   Hydration Status:  Euvolemic   PONV Controlled:  Controlled   Airway Patency:  Patent     Post Op Vitals Reviewed: Yes    No anethesia notable event occurred.    Staff: CRNA               BP   135/80   Temp   98   Pulse  82   Resp   14   SpO2   98

## 2024-06-21 NOTE — H&P
[facility]  History & Physical     PATIENT INFO     Name: Servando Aviles  YOB: 1980   Age: 44 y.o.   Sex: male   MRN: [id number]     HISTORY OF PRESENT ILLNESS     Servando Aviles is a 44 y.o. year old male who presents for screening colonoscopy.  No prior colonoscopy.  No antithrombotics or anticoagulants.     REVIEW OF SYSTEMS     Per the HPI, and otherwise unremarkable.    Historical Information   Past Medical History:   Diagnosis Date    Hypercholesteremia     Hyperlipidemia      Past Surgical History:   Procedure Laterality Date    WISDOM TOOTH EXTRACTION  1994     Social History   Social History     Substance and Sexual Activity   Alcohol Use Yes    Alcohol/week: 17.0 standard drinks of alcohol    Types: 2 Cans of beer, 15 Shots of liquor per week     Social History     Substance and Sexual Activity   Drug Use Never     Social History     Tobacco Use   Smoking Status Never    Passive exposure: Never   Smokeless Tobacco Never     Family History   Problem Relation Age of Onset    Hyperlipidemia Mother     No Known Problems Father     Heart attack Maternal Grandfather         MEDICATIONS & ALLERGIES     Current Outpatient Medications   Medication Instructions    rosuvastatin (CRESTOR) 40 mg, Oral, Daily     No Known Allergies     PHYSICAL EXAM      Objective   Blood pressure 154/90, pulse 82, temperature (!) 97.3 °F (36.3 °C), temperature source Tympanic, resp. rate 18, height 6' (1.829 m), weight 111 kg (245 lb), SpO2 97%. Body mass index is 33.23 kg/m².    General Appearance:   Alert, cooperative, no distress   Lungs:   Equal chest rise, respirations unlabored    Heart:   Regular rate and rhythm   Abdomen:   Soft, non-tender, non-distended; normal bowel sounds; no masses, no organomegaly    Extremities:   No edema       ASSESSMENT & PLAN     This is a 44 y.o. year old male here for colonoscopy, and he is stable and optimized for his procedure.      Brandon  MARA Dunne.  [facility]  Division of Gastroenterology & Hepatology  Available on TigerText  [email or web address]    ** Please Note: This note is constructed using a voice recognition dictation system. **

## 2024-06-24 PROCEDURE — 88305 TISSUE EXAM BY PATHOLOGIST: CPT | Performed by: PATHOLOGY

## 2024-07-01 ENCOUNTER — HOSPITAL ENCOUNTER (OUTPATIENT)
Dept: RADIOLOGY | Facility: HOSPITAL | Age: 44
Discharge: HOME | End: 2024-07-01
Attending: INTERNAL MEDICINE
Payer: COMMERCIAL

## 2024-07-01 DIAGNOSIS — E78.49 FAMILIAL HYPERLIPIDEMIA: ICD-10-CM

## 2024-07-01 DIAGNOSIS — Z82.49 FAMILY HISTORY OF MI (MYOCARDIAL INFARCTION): ICD-10-CM

## 2024-07-01 PROCEDURE — 75571 CT HRT W/O DYE W/CA TEST: CPT

## 2024-07-03 ENCOUNTER — TELEPHONE (OUTPATIENT)
Dept: GASTROENTEROLOGY | Facility: CLINIC | Age: 44
End: 2024-07-03

## 2024-07-03 NOTE — TELEPHONE ENCOUNTER
----- Message from Brandon Dunne DO sent at 7/3/2024  3:20 PM EDT -----  Hi,    Please inform patient that his colon polyp was a benign but precancerous type polyp called a tubular adenoma. There were no other concerning findings.    I recommend repeating colonoscopy in 7 years.    Thanks.    Brandon Dunne D.O.  [facility]  Division of Gastroenterology & Hepatology  Available on TigerText  [email or web address]

## 2024-08-06 ENCOUNTER — VBI (OUTPATIENT)
Dept: ADMINISTRATIVE | Facility: OTHER | Age: 44
End: 2024-08-06

## 2024-08-06 NOTE — TELEPHONE ENCOUNTER
08/06/24 2:43 PM     Chart reviewed for CRC: Colonoscopy was/were submitted to the patient's insurance.     Lesli William MA   [facility]

## 2024-08-23 ENCOUNTER — RA CDI HCC (OUTPATIENT)
Dept: OTHER | Facility: HOSPITAL | Age: 44
End: 2024-08-23

## 2024-08-26 ENCOUNTER — APPOINTMENT (OUTPATIENT)
Dept: LAB | Facility: HOSPITAL | Age: 44
End: 2024-08-26
Payer: COMMERCIAL

## 2024-08-26 DIAGNOSIS — I10 ESSENTIAL HYPERTENSION: ICD-10-CM

## 2024-08-26 DIAGNOSIS — E78.00 HYPERCHOLESTEROLEMIA: ICD-10-CM

## 2024-08-26 DIAGNOSIS — Z82.49 FAMILY HISTORY OF MI (MYOCARDIAL INFARCTION): ICD-10-CM

## 2024-08-26 DIAGNOSIS — E78.49 FAMILIAL HYPERLIPIDEMIA: ICD-10-CM

## 2024-08-26 LAB
ALBUMIN SERPL BCG-MCNC: 4.6 G/DL (ref 3.5–5)
ALP SERPL-CCNC: 91 U/L (ref 34–104)
ALT SERPL W P-5'-P-CCNC: 40 U/L (ref 7–52)
ANION GAP SERPL CALCULATED.3IONS-SCNC: 8 MMOL/L (ref 4–13)
AST SERPL W P-5'-P-CCNC: 28 U/L (ref 13–39)
BASOPHILS # BLD AUTO: 0.07 THOUSANDS/ÂΜL (ref 0–0.1)
BASOPHILS NFR BLD AUTO: 1 % (ref 0–1)
BILIRUB SERPL-MCNC: 0.73 MG/DL (ref 0.2–1)
BUN SERPL-MCNC: 13 MG/DL (ref 5–25)
CALCIUM SERPL-MCNC: 9.5 MG/DL (ref 8.4–10.2)
CHLORIDE SERPL-SCNC: 102 MMOL/L (ref 96–108)
CHOLEST SERPL-MCNC: 262 MG/DL
CO2 SERPL-SCNC: 27 MMOL/L (ref 21–32)
CREAT SERPL-MCNC: 1.01 MG/DL (ref 0.6–1.3)
EOSINOPHIL # BLD AUTO: 0.2 THOUSAND/ÂΜL (ref 0–0.61)
EOSINOPHIL NFR BLD AUTO: 4 % (ref 0–6)
ERYTHROCYTE [DISTWIDTH] IN BLOOD BY AUTOMATED COUNT: 12.3 % (ref 11.6–15.1)
GFR SERPL CREATININE-BSD FRML MDRD: 88 ML/MIN/1.73SQ M
GLUCOSE P FAST SERPL-MCNC: 108 MG/DL (ref 65–99)
HCT VFR BLD AUTO: 46.2 % (ref 36.5–49.3)
HDLC SERPL-MCNC: 72 MG/DL
HGB BLD-MCNC: 15.6 G/DL (ref 12–17)
IMM GRANULOCYTES # BLD AUTO: 0.01 THOUSAND/UL (ref 0–0.2)
IMM GRANULOCYTES NFR BLD AUTO: 0 % (ref 0–2)
LDLC SERPL CALC-MCNC: 173 MG/DL (ref 0–100)
LYMPHOCYTES # BLD AUTO: 1.7 THOUSANDS/ÂΜL (ref 0.6–4.47)
LYMPHOCYTES NFR BLD AUTO: 31 % (ref 14–44)
MCH RBC QN AUTO: 31.1 PG (ref 26.8–34.3)
MCHC RBC AUTO-ENTMCNC: 33.8 G/DL (ref 31.4–37.4)
MCV RBC AUTO: 92 FL (ref 82–98)
MONOCYTES # BLD AUTO: 0.43 THOUSAND/ÂΜL (ref 0.17–1.22)
MONOCYTES NFR BLD AUTO: 8 % (ref 4–12)
NEUTROPHILS # BLD AUTO: 3.17 THOUSANDS/ÂΜL (ref 1.85–7.62)
NEUTS SEG NFR BLD AUTO: 56 % (ref 43–75)
NRBC BLD AUTO-RTO: 0 /100 WBCS
PLATELET # BLD AUTO: 197 THOUSANDS/UL (ref 149–390)
PMV BLD AUTO: 9.4 FL (ref 8.9–12.7)
POTASSIUM SERPL-SCNC: 3.9 MMOL/L (ref 3.5–5.3)
PROT SERPL-MCNC: 7.3 G/DL (ref 6.4–8.4)
RBC # BLD AUTO: 5.01 MILLION/UL (ref 3.88–5.62)
SODIUM SERPL-SCNC: 137 MMOL/L (ref 135–147)
TRIGL SERPL-MCNC: 83 MG/DL
TSH SERPL DL<=0.05 MIU/L-ACNC: 3.52 UIU/ML (ref 0.45–4.5)
WBC # BLD AUTO: 5.58 THOUSAND/UL (ref 4.31–10.16)

## 2024-08-26 PROCEDURE — 84443 ASSAY THYROID STIM HORMONE: CPT

## 2024-08-26 PROCEDURE — 80061 LIPID PANEL: CPT

## 2024-08-26 PROCEDURE — 85025 COMPLETE CBC W/AUTO DIFF WBC: CPT

## 2024-08-26 PROCEDURE — 36415 COLL VENOUS BLD VENIPUNCTURE: CPT

## 2024-08-26 PROCEDURE — 80053 COMPREHEN METABOLIC PANEL: CPT

## 2024-08-29 ENCOUNTER — OFFICE VISIT (OUTPATIENT)
Dept: CARDIOLOGY CLINIC | Facility: CLINIC | Age: 44
End: 2024-08-29
Payer: COMMERCIAL

## 2024-08-29 VITALS
WEIGHT: 255 LBS | DIASTOLIC BLOOD PRESSURE: 94 MMHG | HEIGHT: 73 IN | BODY MASS INDEX: 33.8 KG/M2 | SYSTOLIC BLOOD PRESSURE: 122 MMHG | HEART RATE: 98 BPM

## 2024-08-29 DIAGNOSIS — E66.09 CLASS 1 OBESITY DUE TO EXCESS CALORIES WITHOUT SERIOUS COMORBIDITY WITH BODY MASS INDEX (BMI) OF 33.0 TO 33.9 IN ADULT: ICD-10-CM

## 2024-08-29 DIAGNOSIS — R03.0 ELEVATED BP WITHOUT DIAGNOSIS OF HYPERTENSION: ICD-10-CM

## 2024-08-29 DIAGNOSIS — Z82.49 FAMILY HISTORY OF MI (MYOCARDIAL INFARCTION): ICD-10-CM

## 2024-08-29 DIAGNOSIS — E78.49 FAMILIAL HYPERLIPIDEMIA: Primary | ICD-10-CM

## 2024-08-29 PROCEDURE — 99214 OFFICE O/P EST MOD 30 MIN: CPT | Performed by: INTERNAL MEDICINE

## 2024-08-29 RX ORDER — EZETIMIBE 10 MG/1
10 TABLET ORAL DAILY
Qty: 30 TABLET | Refills: 2 | Status: SHIPPED | OUTPATIENT
Start: 2024-08-29

## 2024-08-29 NOTE — PROGRESS NOTES
[facility] [place]   [address] [facility] [address]                                            Cardiology Office Follow up  Servando Aviles, 44 y.o. male  YOB: 1980  MRN: [id number] Encounter: [id number]      PCP - Justino Moncada DO  Referring Provider - No ref. provider found    Chief Complaint   Patient presents with   • Hyperlipidemia       Assessment  Familial hyperlipidemia   Family history of heart attack  Obesity, Body mass index is 33.64 kg/m².   2014 - was 190 lbs (with exertion)    Plan  Familial hyperlipidemia, Family h/o heart attack    Lipid therapy so far through PCP  7/2023 - started on atorvastatin 20  10/2023 - atorvastatin increased to 40  1/2024: atorvastatin 40 switched to simvastatin 20  3/2024: simvastatin increased to 40 mg daily  5/14/2024: simvastatin increased to 80 mg daily  6/3/24: initial OV with me  No problems with statins so far, asymptomatic  8/2024: Tolerating rosuvastatin 40 mg and cholesterol levels are much improved but still remain elevated  CT CCS = 0  Impression  Likely has heterozygous familial hyperlipidemia (= definite familial hyperlipidemia by Venezuelan criteria for FH)  Inadequate control despite high yes intensity of statin therapy  Plan  Continue rosuvastatin 40 mg daily  Add ezetimibe 10 mg daily  Continue to work on diet modifications and weight loss with a goal weight of at least below 235 pounds  Follow-up lipid panel, CMP in 3 months  Discussed genetic testing for hyperlipidemia, but we have decided to hold off for now - as unlikely to   If cholesterol level remains elevated despite this, then he will need addition of PCSK9 inhibitor  Encouraged more cardio exercises - he is back to doing high intensity training      Obesity - Body mass index is 33.64 kg/m².   His weight was down to 190 pounds back in 2014 with exercise and dietary changes, but subsequently with  sedentary state at work, he has gained a lot of weight  Wt stable/slightly up from 245 lbs - 255 lbs  Recently he has restarted exercise and is working on weight loss  Dietary modifications, weight loss recommended    No results found for this visit on 24.      Orders Placed This Encounter   Procedures   • Lipid Panel with Direct LDL reflex   • Comprehensive metabolic panel       Return in about 3 months (around 2024), or if symptoms worsen or fail to improve.      History of Present Illness   44 y.o. male , who works as an  at a chemical company, comes in as a new patient for consultation regarding uncontrolled cholesterol levels despite medical therapy, after being referred by PCP.    He reports to me that he has had longstanding history of hyperlipidemia and he was first found to have high cholesterol levels when he was in high school.  He was monitored thereafter, but then subsequently he did not follow through.  In  his weight was down with a lot of exercise and he was continued to do well.  Subsequently he got promoted, and was doing more sedentary work.    Family history  Maternal Grandfather - obesity,  of heart attack at 44  Father - alive at 76, good health, no major medical problems  Mother - alive at 75, HLD, no known CAD or stroke  Siblings: 1 older brother (50), 1 older sister (48)  Brother - Hyperlipidemia since young age, no known CAD  Sister - unknown medical issues (lives in [place])  Children - 2 girls, 1 boy  1 daughter 20 - no major medical problems    Interval history - 2024  Returns for follow-up after about 2 months.  He remains free of chest pain, shortness of breath, palpitations or dizziness. He was doing regular exercises until 2024, recently has been busy. Now back to exercise with HIT training, including about 8 min treadmill (6 mph), without any symptoms.    Historical Information   Past Medical History:   Diagnosis Date   •  "Hypercholesteremia    • Hyperlipidemia      Past Surgical History:   Procedure Laterality Date   • WISDOM TOOTH EXTRACTION  1994     Family History   Problem Relation Age of Onset   • Hyperlipidemia Mother    • No Known Problems Father    • Heart attack Maternal Grandfather      Current Outpatient Medications   Medication Instructions   • ezetimibe (ZETIA) 10 mg, Oral, Daily   • rosuvastatin (CRESTOR) 40 mg, Oral, Daily      No Known Allergies  Social History     Socioeconomic History   • Marital status: /Civil Union     Spouse name: None   • Number of children: None   • Years of education: None   • Highest education level: None   Occupational History   • None   Tobacco Use   • Smoking status: Never     Passive exposure: Never   • Smokeless tobacco: Never   Vaping Use   • Vaping status: Never Used   Substance and Sexual Activity   • Alcohol use: Yes     Alcohol/week: 17.0 standard drinks of alcohol     Types: 2 Cans of beer, 15 Shots of liquor per week   • Drug use: Never   • Sexual activity: Yes     Partners: Female     Birth control/protection: Coitus interruptus   Other Topics Concern   • None   Social History Narrative   • None     Social Determinants of Health     Financial Resource Strain: Not on file   Food Insecurity: Not on file   Transportation Needs: Not on file   Physical Activity: Not on file   Stress: Not on file   Social Connections: Not on file   Intimate Partner Violence: Not on file   Housing Stability: Not on file        Review of Systems   All other systems reviewed and are negative.        Vitals:  Vitals:    08/29/24 1308   BP: 122/94   Pulse: 98   Weight: 116 kg (255 lb)   Height: 6' 1\" (1.854 m)     BMI - Body mass index is 33.64 kg/m².  Wt Readings from Last 7 Encounters:   08/29/24 116 kg (255 lb)   06/21/24 111 kg (245 lb)   06/03/24 116 kg (255 lb 3.2 oz)   05/17/24 115 kg (253 lb)   03/15/24 114 kg (251 lb 9.6 oz)   01/12/24 115 kg (253 lb 3.2 oz)   10/12/23 111 kg (245 lb) "       Physical Exam  Vitals and nursing note reviewed.   Constitutional:       General: He is not in acute distress.     Appearance: Normal appearance. He is well-developed. He is obese. He is not ill-appearing or diaphoretic.   HENT:      Head: Normocephalic and atraumatic.      Nose: No congestion.   Eyes:      General: No scleral icterus.     Conjunctiva/sclera: Conjunctivae normal.   Neck:      Vascular: No carotid bruit or JVD.   Cardiovascular:      Rate and Rhythm: Normal rate and regular rhythm.      Heart sounds: Normal heart sounds. No murmur heard.     No friction rub. No gallop.   Pulmonary:      Effort: Pulmonary effort is normal. No respiratory distress.      Breath sounds: Normal breath sounds. No wheezing or rales.   Chest:      Chest wall: No tenderness.   Abdominal:      General: There is no distension.      Palpations: Abdomen is soft.      Tenderness: There is no abdominal tenderness.   Musculoskeletal:         General: No swelling, tenderness or deformity.      Cervical back: Neck supple. No muscular tenderness.      Right lower leg: No edema.      Left lower leg: No edema.   Skin:     General: Skin is warm.   Neurological:      General: No focal deficit present.      Mental Status: He is alert and oriented to person, place, and time. Mental status is at baseline.   Psychiatric:         Mood and Affect: Mood normal.         Behavior: Behavior normal.         Thought Content: Thought content normal.           Labs:  CBC:   Lab Results   Component Value Date    WBC 5.58 08/26/2024    RBC 5.01 08/26/2024    HGB 15.6 08/26/2024    HCT 46.2 08/26/2024    MCV 92 08/26/2024     08/26/2024    RDW 12.3 08/26/2024       CMP:   Lab Results   Component Value Date    K 3.9 08/26/2024     08/26/2024    CO2 27 08/26/2024    BUN 13 08/26/2024    CREATININE 1.01 08/26/2024    EGFR 88 08/26/2024    CALCIUM 9.5 08/26/2024    AST 28 08/26/2024    ALT 40 08/26/2024    ALKPHOS 91 08/26/2024  "      Magnesium:  No results found for: \"MG\"    Lipid Profile:   Lab Results   Component Value Date    HDL 72 08/26/2024    TRIG 83 08/26/2024    LDLCALC 173 (H) 08/26/2024       Thyroid Studies:   Lab Results   Component Value Date    [id number] 3.516 08/26/2024       A1c:  No components found for: \"HGA1C\"    INR:  No results found for: \"INR\"5    Cardiac testing:   No results found for this or any previous visit.    No results found for this or any previous visit.    No results found for this or any previous visit.    No results found for this or any previous visit.      CT coronary calcium score  Narrative: CT CORONARY ARTERY CALCIUM SCREENING WITHOUT INTRAVENOUS CONTRAST    INDICATION: E78.49: Other hyperlipidemia  Z82.49: Family history of ischemic heart disease and other diseases of the circulatory system. Assess for calcific coronary plaque.  Patient Age: 44 years.  Patient Gender: Male.    COMPARISON: None.    TECHNIQUE: Low radiation dose computed tomography of the heart was performed with EKG gating, suspended respiration, and without intravenous contrast. This examination, like all CT scans performed in the [facility], was performed   utilizing techniques to minimize radiation dose exposure, including the use of iterative reconstruction and automated exposure control. Radiation dose length product (DLP) for this visit: 149 mGy-cm    Postprocessing was performed on a computer workstation to measure the amount of calcium in the coronary arteries. Imaging was limited to the heart and the immediately adjacent lungs.    FINDINGS:    Coronary artery calcium score breakdown is as follows:    Left main coronary artery: 0  Left anterior descending coronary artery and diagonal branches: 0  Left circumflex coronary artery and left marginal branches: 0  Right coronary artery and right marginal branches: 0  Posterior descending coronary artery: 0    TOTAL coronary calcium score: 0    The PROBABILITY " of a non-zero calcium score in participants in the in the Multi-Ethnic Study of Atherosclerosis (JUSTICE) trial matched to this patient's age, race and gender is: 28%    HEART/GREAT VESSELS: No significant low radiation dose noncontrast CT abnormality of the heart. No aneurysmal dilatation within visualized segments of thoracic aorta.    EXTRACARDIAC FINDINGS: No significant findings identified on limited small field of view low radiation dose noncontrast images of the chest at the level of the heart. Small left hepatic lobe cyst noted in the visualized upper abdomen.  Impression: Total coronary calcium score equals 0. For more useful information regarding the prognostic significance of the calcium score, please consult the calculator at the website [email or web address].    Workstation performed: [id number]

## 2024-08-29 NOTE — PROGRESS NOTES
Assessment/Plan:    No problem-specific Assessment & Plan notes found for this encounter.       Diagnoses and all orders for this visit:    Annual physical exam    Hypercholesterolemia  Comments:  manuel zetia added by cardiology    Hyperglycemia  Comments:  mild, pt counseled on diet and exercise    Class 1 obesity due to excess calories with serious comorbidity and body mass index (BMI) of 33.0 to 33.9 in adult  Comments:  pt counseled on diet and exercise          PHQ-2/9 Depression Screening    Little interest or pleasure in doing things: 0 - not at all  Feeling down, depressed, or hopeless: 0 - not at all  PHQ-2 Score: 0  PHQ-2 Interpretation: Negative depression screen            Subjective:      Patient ID: Servando Aviles is a 44 y.o. male.    Pt here for annual physical, also follow up for hyperlipidemia which has been resistant to statin treatment, pt was switched to crestor and zetia, cholesterol has mildly improved    Hyperlipidemia  This is a chronic problem. The current episode started more than 1 year ago. The problem is uncontrolled. Recent lipid tests were reviewed and are high. Pertinent negatives include no chest pain, myalgias or shortness of breath. Current antihyperlipidemic treatment includes statins and ezetimibe. The current treatment provides moderate improvement of lipids. Compliance problems include adherence to exercise and adherence to diet.        The following portions of the patient's history were reviewed and updated as appropriate: allergies, current medications, past family history, past medical history, past social history, past surgical history, and problem list.    Review of Systems   Constitutional: Negative.  Negative for chills, fatigue and fever.   HENT: Negative.  Negative for hearing loss.    Eyes: Negative.  Negative for visual disturbance.   Respiratory:  Negative for shortness of breath and wheezing.    Cardiovascular:  Negative for chest pain and  "palpitations.   Gastrointestinal:  Negative for abdominal pain, blood in stool, constipation, diarrhea, nausea and vomiting.   Endocrine: Negative.    Genitourinary:  Negative for difficulty urinating and dysuria.   Musculoskeletal:  Negative for arthralgias and myalgias.   Skin: Negative.    Allergic/Immunologic: Negative.    Neurological:  Negative for seizures and syncope.   Hematological:  Negative for adenopathy.   Psychiatric/Behavioral: Negative.           Objective:    /78   Pulse 95   Temp 98.2 °F (36.8 °C)   Ht 6' 1\" (1.854 m)   Wt 114 kg (251 lb)   SpO2 97%   BMI 33.12 kg/m²      Physical Exam  Vitals and nursing note reviewed.   Constitutional:       General: He is not in acute distress.     Appearance: Normal appearance. He is well-developed. He is not ill-appearing, toxic-appearing or diaphoretic.   HENT:      Head: Normocephalic and atraumatic.      Right Ear: Tympanic membrane, ear canal and external ear normal. There is no impacted cerumen.      Left Ear: Tympanic membrane, ear canal and external ear normal. There is no impacted cerumen.      Nose: Nose normal. No congestion or rhinorrhea.      Mouth/Throat:      Mouth: Mucous membranes are moist.      Pharynx: Oropharynx is clear. No oropharyngeal exudate or posterior oropharyngeal erythema.   Eyes:      General: No scleral icterus.        Right eye: No discharge.         Left eye: No discharge.      Extraocular Movements: Extraocular movements intact.      Conjunctiva/sclera: Conjunctivae normal.      Pupils: Pupils are equal, round, and reactive to light.   Cardiovascular:      Rate and Rhythm: Normal rate and regular rhythm.      Pulses: Normal pulses.      Heart sounds: Normal heart sounds. No murmur heard.     No friction rub. No gallop.   Pulmonary:      Effort: Pulmonary effort is normal. No respiratory distress.      Breath sounds: Normal breath sounds. No wheezing, rhonchi or rales.   Abdominal:      General: Bowel sounds are " normal. There is no distension.      Palpations: Abdomen is soft. There is no mass.      Tenderness: There is no abdominal tenderness. There is no guarding or rebound.   Musculoskeletal:         General: No swelling or deformity. Normal range of motion.      Cervical back: Normal range of motion and neck supple. No rigidity.      Right lower leg: No edema.      Left lower leg: No edema.   Lymphadenopathy:      Cervical: No cervical adenopathy.   Skin:     General: Skin is warm and dry.      Findings: No rash.   Neurological:      General: No focal deficit present.      Mental Status: He is alert and oriented to person, place, and time.      Sensory: No sensory deficit.      Motor: No weakness.      Coordination: Coordination normal.      Gait: Gait normal.      Deep Tendon Reflexes: Reflexes are normal and symmetric. Reflexes normal.   Psychiatric:         Mood and Affect: Mood normal.         Behavior: Behavior normal.         Thought Content: Thought content normal.         Judgment: Judgment normal.

## 2024-08-30 ENCOUNTER — OFFICE VISIT (OUTPATIENT)
Dept: FAMILY MEDICINE CLINIC | Facility: CLINIC | Age: 44
End: 2024-08-30
Payer: COMMERCIAL

## 2024-08-30 VITALS
HEIGHT: 73 IN | WEIGHT: 251 LBS | TEMPERATURE: 98.2 F | BODY MASS INDEX: 33.27 KG/M2 | DIASTOLIC BLOOD PRESSURE: 78 MMHG | SYSTOLIC BLOOD PRESSURE: 126 MMHG | OXYGEN SATURATION: 97 % | HEART RATE: 95 BPM

## 2024-08-30 DIAGNOSIS — E66.09 CLASS 1 OBESITY DUE TO EXCESS CALORIES WITH SERIOUS COMORBIDITY AND BODY MASS INDEX (BMI) OF 33.0 TO 33.9 IN ADULT: ICD-10-CM

## 2024-08-30 DIAGNOSIS — R73.9 HYPERGLYCEMIA: ICD-10-CM

## 2024-08-30 DIAGNOSIS — Z00.00 ANNUAL PHYSICAL EXAM: Primary | ICD-10-CM

## 2024-08-30 DIAGNOSIS — E78.00 HYPERCHOLESTEROLEMIA: ICD-10-CM

## 2024-08-30 PROCEDURE — 99396 PREV VISIT EST AGE 40-64: CPT | Performed by: FAMILY MEDICINE

## 2024-08-30 PROCEDURE — 99213 OFFICE O/P EST LOW 20 MIN: CPT | Performed by: FAMILY MEDICINE

## 2024-11-18 ENCOUNTER — APPOINTMENT (OUTPATIENT)
Dept: LAB | Facility: HOSPITAL | Age: 44
End: 2024-11-18
Payer: COMMERCIAL

## 2024-11-18 DIAGNOSIS — E78.49 FAMILIAL HYPERLIPIDEMIA: ICD-10-CM

## 2024-11-18 LAB
ALBUMIN SERPL BCG-MCNC: 4.7 G/DL (ref 3.5–5)
ALP SERPL-CCNC: 86 U/L (ref 34–104)
ALT SERPL W P-5'-P-CCNC: 48 U/L (ref 7–52)
ANION GAP SERPL CALCULATED.3IONS-SCNC: 9 MMOL/L (ref 4–13)
AST SERPL W P-5'-P-CCNC: 31 U/L (ref 13–39)
BILIRUB SERPL-MCNC: 0.6 MG/DL (ref 0.2–1)
BUN SERPL-MCNC: 12 MG/DL (ref 5–25)
CALCIUM SERPL-MCNC: 9.4 MG/DL (ref 8.4–10.2)
CHLORIDE SERPL-SCNC: 104 MMOL/L (ref 96–108)
CHOLEST SERPL-MCNC: 227 MG/DL (ref ?–200)
CO2 SERPL-SCNC: 27 MMOL/L (ref 21–32)
CREAT SERPL-MCNC: 1 MG/DL (ref 0.6–1.3)
GFR SERPL CREATININE-BSD FRML MDRD: 89 ML/MIN/1.73SQ M
GLUCOSE P FAST SERPL-MCNC: 101 MG/DL (ref 65–99)
HDLC SERPL-MCNC: 71 MG/DL
LDLC SERPL CALC-MCNC: 138 MG/DL (ref 0–100)
POTASSIUM SERPL-SCNC: 4 MMOL/L (ref 3.5–5.3)
PROT SERPL-MCNC: 7.5 G/DL (ref 6.4–8.4)
SODIUM SERPL-SCNC: 140 MMOL/L (ref 135–147)
TRIGL SERPL-MCNC: 90 MG/DL (ref ?–150)

## 2024-11-18 PROCEDURE — 36415 COLL VENOUS BLD VENIPUNCTURE: CPT

## 2024-11-18 PROCEDURE — 80053 COMPREHEN METABOLIC PANEL: CPT

## 2024-11-18 PROCEDURE — 80061 LIPID PANEL: CPT

## 2024-11-21 ENCOUNTER — OFFICE VISIT (OUTPATIENT)
Dept: CARDIOLOGY CLINIC | Facility: CLINIC | Age: 44
End: 2024-11-21
Payer: COMMERCIAL

## 2024-11-21 VITALS
HEART RATE: 84 BPM | DIASTOLIC BLOOD PRESSURE: 90 MMHG | BODY MASS INDEX: 34.19 KG/M2 | WEIGHT: 258 LBS | SYSTOLIC BLOOD PRESSURE: 146 MMHG | HEIGHT: 73 IN

## 2024-11-21 DIAGNOSIS — E66.09 CLASS 1 OBESITY DUE TO EXCESS CALORIES WITHOUT SERIOUS COMORBIDITY WITH BODY MASS INDEX (BMI) OF 33.0 TO 33.9 IN ADULT: ICD-10-CM

## 2024-11-21 DIAGNOSIS — E66.811 CLASS 1 OBESITY DUE TO EXCESS CALORIES WITHOUT SERIOUS COMORBIDITY WITH BODY MASS INDEX (BMI) OF 33.0 TO 33.9 IN ADULT: ICD-10-CM

## 2024-11-21 DIAGNOSIS — Z82.49 FAMILY HISTORY OF MI (MYOCARDIAL INFARCTION): ICD-10-CM

## 2024-11-21 DIAGNOSIS — R03.0 ELEVATED BP WITHOUT DIAGNOSIS OF HYPERTENSION: ICD-10-CM

## 2024-11-21 DIAGNOSIS — E78.49 FAMILIAL HYPERLIPIDEMIA: Primary | ICD-10-CM

## 2024-11-21 PROCEDURE — 99214 OFFICE O/P EST MOD 30 MIN: CPT | Performed by: INTERNAL MEDICINE

## 2024-11-21 RX ORDER — ROSUVASTATIN CALCIUM 40 MG/1
40 TABLET, COATED ORAL DAILY
Qty: 90 TABLET | Refills: 3 | Status: SHIPPED | OUTPATIENT
Start: 2024-11-21

## 2024-11-21 RX ORDER — EZETIMIBE 10 MG/1
10 TABLET ORAL DAILY
Qty: 90 TABLET | Refills: 3 | Status: SHIPPED | OUTPATIENT
Start: 2024-11-21

## 2024-11-21 NOTE — PROGRESS NOTES
[facility] [place]   [address] [facility] [address]                                            Cardiology Office Follow up  Servando Aviles, 44 y.o. male  YOB: 1980  MRN: [id number] Encounter: [id number]      PCP - Justino Moncada DO  Referring Provider - No ref. provider found    Chief Complaint   Patient presents with    Follow-up     3 month        Assessment  Familial hyperlipidemia   Family history of heart attack  Obesity, Body mass index is 34.04 kg/m².   2014 - was 190 lbs (with exertion)    Plan  Familial hyperlipidemia, Family h/o heart attack    Overview  Lipid therapy so far through PCP  7/2023 - started on atorvastatin 20  10/2023 - atorvastatin increased to 40  1/2024: atorvastatin 40 switched to simvastatin 20  3/2024: simvastatin increased to 40 mg daily  5/14/2024: simvastatin increased to 80 mg daily  6/3/24: initial OV with me  No problems with statins so far, asymptomatic  8/2024: Tolerating rosuvastatin 40 mg and cholesterol levels are much improved but still remain elevated  CT CAC = 0  Started on ezetimibe 10 mg daily  11/2024: Cholesterol levels improving, but LDL still not at goal  Impression  Likely has heterozygous familial hyperlipidemia (= definite familial hyperlipidemia by Austrian criteria for FH)  Inadequate control despite high intensity statin therapy + ezetimibe  Plan  Continue rosuvastatin 40 mg daily, Zetia 10 mg daily   With continued elevation in cholesterol and LDL levels despite high-dose statin and ezetimibe, I suspect he will need additional therapy with PCSK9 inhibitor  Discussed regarding initiating stream at present v/s trying to lose additional weight and further optimize diet and then decide in a few months about it  Continue cardio exercises - he is back to doing high intensity training      Obesity - Body mass index is 34.04 kg/m².   His weight was down to 190 pounds back in 2014 with  exercise and dietary changes, but subsequently with sedentary state at work, he has gained a lot of weight  Weight remains stable at 255 to 258 pounds now   Encouraged further weight loss to goal BMI of at least below 28     Elevated Bp without diagnosis of HTN  Bp today 146/90  BP otherwise recently has been well controlled  Low sodium diet  Monitor BP   Weight loss recommended    No results found for this visit on 24.      Orders Placed This Encounter   Procedures    Lipid Panel with Direct LDL reflex    Comprehensive metabolic panel       Return in about 6 months (around 2025), or if symptoms worsen or fail to improve.      History of Present Illness   44 y.o. male , who works as an  at a chemical company, comes in as a new patient for consultation regarding uncontrolled cholesterol levels despite medical therapy, after being referred by PCP.    He reports to me that he has had longstanding history of hyperlipidemia and he was first found to have high cholesterol levels when he was in high school.  He was monitored thereafter, but then subsequently he did not follow through.  In  his weight was down with a lot of exercise and he was continued to do well.  Subsequently he got promoted, and was doing more sedentary work.    Family history  Maternal Grandfather - obesity,  of heart attack at 44  Father - alive at 76, good health, no major medical problems  Mother - alive at 75, HLD, no known CAD or stroke  Siblings: 1 older brother (50), 1 older sister (48)  Brother - Hyperlipidemia since young age, no known CAD  Sister - unknown medical issues (lives in [place])  Children - 2 girls, 1 boy  1 daughter 20 - no major medical problems    Interval history - 2024  Returns for follow-up after about 2 months.  He remains free of chest pain, shortness of breath, palpitations or dizziness. He was doing regular exercises until 2024, recently has been busy. Now back to exercise with  HIT training, including about 8 min treadmill (6 mph), without any symptoms.    Interval history - 11/21/2024  He returns for 3-month follow-up.  He continues to do well and remains free of any chest pain, shortness of breath or palpitations.  He is very active and exercising regularly.  He recently had a steps challenge at work, and he did 14-15k steps/day on average.     Historical Information   Past Medical History:   Diagnosis Date    Hypercholesteremia     Hyperlipidemia      Past Surgical History:   Procedure Laterality Date    WISDOM TOOTH EXTRACTION  1994     Family History   Problem Relation Age of Onset    Hyperlipidemia Mother     No Known Problems Father     Heart attack Maternal Grandfather      Current Outpatient Medications   Medication Instructions    ezetimibe (ZETIA) 10 mg, Oral, Daily    rosuvastatin (CRESTOR) 40 mg, Oral, Daily      No Known Allergies  Social History     Socioeconomic History    Marital status: /Civil Union     Spouse name: None    Number of children: None    Years of education: None    Highest education level: None   Occupational History    None   Tobacco Use    Smoking status: Never     Passive exposure: Never    Smokeless tobacco: Never   Vaping Use    Vaping status: Never Used   Substance and Sexual Activity    Alcohol use: Yes     Alcohol/week: 17.0 standard drinks of alcohol     Types: 2 Cans of beer, 15 Shots of liquor per week    Drug use: Never    Sexual activity: Yes     Partners: Female     Birth control/protection: Coitus interruptus   Other Topics Concern    None   Social History Narrative    None     Social Drivers of Health     Financial Resource Strain: Not on file   Food Insecurity: Not on file   Transportation Needs: Not on file   Physical Activity: Not on file   Stress: Not on file   Social Connections: Not on file   Intimate Partner Violence: Not on file   Housing Stability: Not on file        Review of Systems   All other systems reviewed and are  "negative.        Vitals:  Vitals:    11/21/24 1141   BP: 146/90   Pulse: 84   Weight: 117 kg (258 lb)   Height: 6' 1\" (1.854 m)     BMI - Body mass index is 34.04 kg/m².  Wt Readings from Last 7 Encounters:   11/21/24 117 kg (258 lb)   08/30/24 114 kg (251 lb)   08/29/24 116 kg (255 lb)   06/21/24 111 kg (245 lb)   06/03/24 116 kg (255 lb 3.2 oz)   05/17/24 115 kg (253 lb)   03/15/24 114 kg (251 lb 9.6 oz)       Physical Exam  Vitals and nursing note reviewed.   Constitutional:       General: He is not in acute distress.     Appearance: Normal appearance. He is well-developed. He is obese. He is not ill-appearing or diaphoretic.   HENT:      Head: Normocephalic and atraumatic.      Nose: No congestion.   Eyes:      General: No scleral icterus.     Conjunctiva/sclera: Conjunctivae normal.   Neck:      Vascular: No carotid bruit or JVD.   Cardiovascular:      Rate and Rhythm: Normal rate and regular rhythm.      Heart sounds: Normal heart sounds. No murmur heard.     No friction rub. No gallop.   Pulmonary:      Effort: Pulmonary effort is normal. No respiratory distress.      Breath sounds: Normal breath sounds. No wheezing or rales.   Chest:      Chest wall: No tenderness.   Abdominal:      General: There is no distension.      Palpations: Abdomen is soft.      Tenderness: There is no abdominal tenderness.   Musculoskeletal:         General: No swelling, tenderness or deformity.      Cervical back: Neck supple. No muscular tenderness.      Right lower leg: No edema.      Left lower leg: No edema.   Skin:     General: Skin is warm.   Neurological:      General: No focal deficit present.      Mental Status: He is alert and oriented to person, place, and time. Mental status is at baseline.   Psychiatric:         Mood and Affect: Mood normal.         Behavior: Behavior normal.         Thought Content: Thought content normal.           Labs:  CBC:   Lab Results   Component Value Date    WBC 5.58 08/26/2024    RBC 5.01 " "08/26/2024    HGB 15.6 08/26/2024    HCT 46.2 08/26/2024    MCV 92 08/26/2024     08/26/2024    RDW 12.3 08/26/2024       CMP:   Lab Results   Component Value Date    K 4.0 11/18/2024     11/18/2024    CO2 27 11/18/2024    BUN 12 11/18/2024    CREATININE 1.00 11/18/2024    EGFR 89 11/18/2024    CALCIUM 9.4 11/18/2024    AST 31 11/18/2024    ALT 48 11/18/2024    ALKPHOS 86 11/18/2024       Magnesium:  No results found for: \"MG\"    Lipid Profile:   Lab Results   Component Value Date    HDL 71 11/18/2024    TRIG 90 11/18/2024    LDLCALC 138 (H) 11/18/2024       Thyroid Studies:   Lab Results   Component Value Date    [id number] 3.516 08/26/2024       A1c:  No components found for: \"HGA1C\"    INR:  No results found for: \"INR\"5    Cardiac testing:   No results found for this or any previous visit.    No results found for this or any previous visit.    No results found for this or any previous visit.    No results found for this or any previous visit.      CT coronary calcium score  Narrative: CT CORONARY ARTERY CALCIUM SCREENING WITHOUT INTRAVENOUS CONTRAST    INDICATION: E78.49: Other hyperlipidemia  Z82.49: Family history of ischemic heart disease and other diseases of the circulatory system. Assess for calcific coronary plaque.  Patient Age: 44 years.  Patient Gender: Male.    COMPARISON: None.    TECHNIQUE: Low radiation dose computed tomography of the heart was performed with EKG gating, suspended respiration, and without intravenous contrast. This examination, like all CT scans performed in the [facility], was performed   utilizing techniques to minimize radiation dose exposure, including the use of iterative reconstruction and automated exposure control. Radiation dose length product (DLP) for this visit: 149 mGy-cm    Postprocessing was performed on a computer workstation to measure the amount of calcium in the coronary arteries. Imaging was limited to the heart and the immediately " adjacent lungs.    FINDINGS:    Coronary artery calcium score breakdown is as follows:    Left main coronary artery: 0  Left anterior descending coronary artery and diagonal branches: 0  Left circumflex coronary artery and left marginal branches: 0  Right coronary artery and right marginal branches: 0  Posterior descending coronary artery: 0    TOTAL coronary calcium score: 0    The PROBABILITY of a non-zero calcium score in participants in the in the Multi-Ethnic Study of Atherosclerosis (JUSTICE) trial matched to this patient's age, race and gender is: 28%    HEART/GREAT VESSELS: No significant low radiation dose noncontrast CT abnormality of the heart. No aneurysmal dilatation within visualized segments of thoracic aorta.    EXTRACARDIAC FINDINGS: No significant findings identified on limited small field of view low radiation dose noncontrast images of the chest at the level of the heart. Small left hepatic lobe cyst noted in the visualized upper abdomen.  Impression: Total coronary calcium score equals 0. For more useful information regarding the prognostic significance of the calcium score, please consult the calculator at the website http://[email or web address]-[email or web address].aspx.    Workstation performed: [id number]

## 2025-01-29 ENCOUNTER — VBI (OUTPATIENT)
Dept: ADMINISTRATIVE | Facility: OTHER | Age: 45
End: 2025-01-29

## 2025-01-29 NOTE — TELEPHONE ENCOUNTER
01/29/25 3:52 PM     Chart reviewed for Blood Pressure ; nothing is submitted to the patient's insurance at this time.     Janie Ross MA   [facility]

## 2025-03-14 ENCOUNTER — OFFICE VISIT (OUTPATIENT)
Dept: FAMILY MEDICINE CLINIC | Facility: CLINIC | Age: 45
End: 2025-03-14
Payer: COMMERCIAL

## 2025-03-14 VITALS
WEIGHT: 257 LBS | TEMPERATURE: 98.7 F | DIASTOLIC BLOOD PRESSURE: 98 MMHG | HEART RATE: 119 BPM | SYSTOLIC BLOOD PRESSURE: 146 MMHG | HEIGHT: 73 IN | OXYGEN SATURATION: 98 % | BODY MASS INDEX: 34.06 KG/M2

## 2025-03-14 DIAGNOSIS — E78.00 HYPERCHOLESTEROLEMIA: Primary | ICD-10-CM

## 2025-03-14 DIAGNOSIS — F41.9 ANXIETY: ICD-10-CM

## 2025-03-14 DIAGNOSIS — R03.0 ELEVATED BLOOD PRESSURE READING: ICD-10-CM

## 2025-03-14 PROCEDURE — 99213 OFFICE O/P EST LOW 20 MIN: CPT | Performed by: FAMILY MEDICINE

## 2025-03-14 RX ORDER — BUSPIRONE HYDROCHLORIDE 5 MG/1
5 TABLET ORAL 2 TIMES DAILY
Qty: 180 TABLET | Refills: 3 | Status: SHIPPED | OUTPATIENT
Start: 2025-03-14

## 2025-03-14 NOTE — PROGRESS NOTES
"Assessment/Plan:    No problem-specific Assessment & Plan notes found for this encounter.       Diagnoses and all orders for this visit:    Hypercholesterolemia  Comments:  pt counseled on diet and exercise    Anxiety  -     busPIRone (BUSPAR) 5 mg tablet; Take 1 tablet (5 mg total) by mouth 2 (two) times a day    Elevated blood pressure reading  Comments:  pt is seeing 130 over 80 at home          PHQ-2/9 Depression Screening    Little interest or pleasure in doing things: 0 - not at all  Feeling down, depressed, or hopeless: 0 - not at all  PHQ-2 Score: 0  PHQ-2 Interpretation: Negative depression screen            Subjective:      Patient ID: Servando Aviles is a 44 y.o. male.    Hyperlipidemia  This is a chronic problem. The current episode started more than 1 year ago. The problem is uncontrolled. Recent lipid tests were reviewed and are high. Factors aggravating his hyperlipidemia include fatty foods. Pertinent negatives include no chest pain or myalgias. Current antihyperlipidemic treatment includes statins. The current treatment provides significant improvement of lipids.       The following portions of the patient's history were reviewed and updated as appropriate: allergies, current medications, past family history, past medical history, past social history, past surgical history, and problem list.    Review of Systems   Cardiovascular:  Negative for chest pain and palpitations.   Gastrointestinal:  Negative for abdominal pain, nausea and vomiting.   Musculoskeletal:  Negative for myalgias.         Objective:    /98   Pulse (!) 119   Temp 98.7 °F (37.1 °C)   Ht 6' 1\" (1.854 m)   Wt 117 kg (257 lb)   SpO2 98%   BMI 33.91 kg/m²      Physical Exam  Vitals and nursing note reviewed.   Constitutional:       General: He is not in acute distress.     Appearance: Normal appearance. He is not ill-appearing, toxic-appearing or diaphoretic.   HENT:      Head: Normocephalic and atraumatic.   Eyes:      " Conjunctiva/sclera: Conjunctivae normal.   Cardiovascular:      Rate and Rhythm: Normal rate and regular rhythm.      Heart sounds: Normal heart sounds. No murmur heard.  Pulmonary:      Effort: Pulmonary effort is normal. No respiratory distress.      Breath sounds: Normal breath sounds. No wheezing, rhonchi or rales.   Abdominal:      General: There is no distension.      Palpations: Abdomen is soft. There is no mass.      Tenderness: There is no abdominal tenderness. There is no guarding or rebound.   Musculoskeletal:      Cervical back: Normal range of motion and neck supple. No rigidity.      Right lower leg: No edema.      Left lower leg: No edema.   Lymphadenopathy:      Cervical: No cervical adenopathy.   Skin:     General: Skin is warm and dry.   Neurological:      Mental Status: He is alert and oriented to person, place, and time.   Psychiatric:         Mood and Affect: Mood normal.         Behavior: Behavior normal.         Thought Content: Thought content normal.         Judgment: Judgment normal.

## 2025-05-06 ENCOUNTER — APPOINTMENT (OUTPATIENT)
Dept: LAB | Facility: MEDICAL CENTER | Age: 45
End: 2025-05-06
Attending: INTERNAL MEDICINE
Payer: COMMERCIAL

## 2025-05-06 DIAGNOSIS — E78.49 FAMILIAL HYPERLIPIDEMIA: ICD-10-CM

## 2025-05-06 DIAGNOSIS — Z82.49 FAMILY HISTORY OF MI (MYOCARDIAL INFARCTION): ICD-10-CM

## 2025-05-06 LAB
ALBUMIN SERPL BCG-MCNC: 4.8 G/DL (ref 3.5–5)
ALP SERPL-CCNC: 92 U/L (ref 34–104)
ALT SERPL W P-5'-P-CCNC: 54 U/L (ref 7–52)
ANION GAP SERPL CALCULATED.3IONS-SCNC: 12 MMOL/L (ref 4–13)
AST SERPL W P-5'-P-CCNC: 38 U/L (ref 13–39)
BILIRUB SERPL-MCNC: 0.84 MG/DL (ref 0.2–1)
BUN SERPL-MCNC: 10 MG/DL (ref 5–25)
CALCIUM SERPL-MCNC: 9.9 MG/DL (ref 8.4–10.2)
CHLORIDE SERPL-SCNC: 101 MMOL/L (ref 96–108)
CHOLEST SERPL-MCNC: 189 MG/DL (ref ?–200)
CO2 SERPL-SCNC: 27 MMOL/L (ref 21–32)
CREAT SERPL-MCNC: 0.97 MG/DL (ref 0.6–1.3)
GFR SERPL CREATININE-BSD FRML MDRD: 93 ML/MIN/1.73SQ M
GLUCOSE P FAST SERPL-MCNC: 101 MG/DL (ref 65–99)
HDLC SERPL-MCNC: 68 MG/DL
LDLC SERPL CALC-MCNC: 104 MG/DL (ref 0–100)
POTASSIUM SERPL-SCNC: 4.5 MMOL/L (ref 3.5–5.3)
PROT SERPL-MCNC: 7.6 G/DL (ref 6.4–8.4)
SODIUM SERPL-SCNC: 140 MMOL/L (ref 135–147)
TRIGL SERPL-MCNC: 83 MG/DL (ref ?–150)

## 2025-05-06 PROCEDURE — 36415 COLL VENOUS BLD VENIPUNCTURE: CPT

## 2025-05-06 PROCEDURE — 80053 COMPREHEN METABOLIC PANEL: CPT

## 2025-05-06 PROCEDURE — 80061 LIPID PANEL: CPT

## 2025-05-07 ENCOUNTER — OFFICE VISIT (OUTPATIENT)
Dept: CARDIOLOGY CLINIC | Facility: CLINIC | Age: 45
End: 2025-05-07
Payer: COMMERCIAL

## 2025-05-07 VITALS
HEART RATE: 116 BPM | BODY MASS INDEX: 36.12 KG/M2 | SYSTOLIC BLOOD PRESSURE: 142 MMHG | OXYGEN SATURATION: 97 % | WEIGHT: 258 LBS | RESPIRATION RATE: 16 BRPM | HEIGHT: 71 IN | DIASTOLIC BLOOD PRESSURE: 88 MMHG

## 2025-05-07 DIAGNOSIS — E66.09 CLASS 1 OBESITY DUE TO EXCESS CALORIES WITHOUT SERIOUS COMORBIDITY WITH BODY MASS INDEX (BMI) OF 33.0 TO 33.9 IN ADULT: ICD-10-CM

## 2025-05-07 DIAGNOSIS — E66.811 CLASS 1 OBESITY DUE TO EXCESS CALORIES WITHOUT SERIOUS COMORBIDITY WITH BODY MASS INDEX (BMI) OF 33.0 TO 33.9 IN ADULT: ICD-10-CM

## 2025-05-07 DIAGNOSIS — Z82.49 FAMILY HISTORY OF MI (MYOCARDIAL INFARCTION): ICD-10-CM

## 2025-05-07 DIAGNOSIS — R03.0 ELEVATED BP WITHOUT DIAGNOSIS OF HYPERTENSION: ICD-10-CM

## 2025-05-07 DIAGNOSIS — E78.49 FAMILIAL HYPERLIPIDEMIA: Primary | ICD-10-CM

## 2025-05-07 PROCEDURE — 93000 ELECTROCARDIOGRAM COMPLETE: CPT | Performed by: INTERNAL MEDICINE

## 2025-05-07 PROCEDURE — 99214 OFFICE O/P EST MOD 30 MIN: CPT | Performed by: INTERNAL MEDICINE

## 2025-05-07 RX ORDER — EZETIMIBE 10 MG/1
10 TABLET ORAL DAILY
Qty: 90 TABLET | Refills: 3 | Status: SHIPPED | OUTPATIENT
Start: 2025-05-07

## 2025-05-07 RX ORDER — ROSUVASTATIN CALCIUM 40 MG/1
40 TABLET, COATED ORAL DAILY
Qty: 90 TABLET | Refills: 3 | Status: SHIPPED | OUTPATIENT
Start: 2025-05-07

## 2025-05-07 NOTE — PROGRESS NOTES
[facility] [place]   [address] [facility] [address]                                            Cardiology Office Follow up  Servando Aviles, 44 y.o. male  YOB: 1980  MRN: [id number] Encounter: [id number]      PCP - Justino Moncada DO  Referring Provider - No ref. provider found    Chief Complaint   Patient presents with   • Follow-up     6 month       Assessment  Familial hyperlipidemia   Family history of heart attack  Obesity, Body mass index is 35.72 kg/m².   2014 - was 190 lbs (with exertion)    Plan  Familial hyperlipidemia, Family h/o heart attack    Overview  Lipid therapy so far through PCP  7/2023 - started on atorvastatin 20  10/2023 - atorvastatin increased to 40  1/2024: atorvastatin 40 switched to simvastatin 20  3/2024: simvastatin increased to 40 mg daily  5/14/2024: simvastatin increased to 80 mg daily  6/3/24: initial OV with me  No problems with statins so far, asymptomatic  8/2024: Tolerating rosuvastatin 40 mg and cholesterol levels are much improved but still remain elevated  CT CAC = 0  Started on ezetimibe 10 mg daily  11/2024: Cholesterol levels improving, but LDL still not at goal  5/2025: Cholesterol levels now much improved and almost at goal, LDL still slightly elevated at 104  Tolerating rosuvastatin 40, ezetimibe 10 mg daily  Impression  Likely has heterozygous familial hyperlipidemia (= definite familial hyperlipidemia by [place] criteria for FH)  Better controlled with diet changes + statin + ezetimibe  Plan  Continue current therapy with   rosuvastatin 40 mg daily  Ezetimibe 10 mg daily   Counseled regarding need for further diet modifications, weight loss to a goal BMI of at least below 30  Continue regular cardio exercises    Obesity - Body mass index is 35.72 kg/m².   His weight was down to 190 pounds back in 2014 with exercise and dietary changes, but subsequently with sedentary state at work, he has  gained a lot of weight  Weight remains stable at 255 to 258 pounds now   Encouraged further weight loss to goal BMI of at least below 28     Elevated Bp without diagnosis of HTN  Blood pressure remains borderline, 142/80, but has recently been in the 130s at home and PCP   Counseled guarding need to reduce sodium intake in diet and read food labels to achieve the same  Weight loss recommended  Monitor blood pressure and keep a log over the next month and follow-up during PCP visit next month.  If blood pressure persistently greater than 140/90, then he would benefit from addition of amlodipine or low dose HCTZ    Results for orders placed or performed in visit on 25   POCT ECG    Impression    Sinus tachycardia  Otherwise normal ECG         Orders Placed This Encounter   Procedures   • POCT ECG         Return in about 1 year (around 2026), or if symptoms worsen or fail to improve.      History of Present Illness   44 y.o. male , who works as an  at a chemical company, comes in as a new patient for consultation regarding uncontrolled cholesterol levels despite medical therapy, after being referred by PCP.    He reports to me that he has had longstanding history of hyperlipidemia and he was first found to have high cholesterol levels when he was in high school.  He was monitored thereafter, but then subsequently he did not follow through.  In  his weight was down with a lot of exercise and he was continued to do well.  Subsequently he got promoted, and was doing more sedentary work.    Family history  Maternal Grandfather - obesity,  of heart attack at 44  Father - alive at 76, good health, no major medical problems  Mother - alive at 75, HLD, no known CAD or stroke  Siblings: 1 older brother (50), 1 older sister (48)  Brother - Hyperlipidemia since young age, no known CAD  Sister - unknown medical issues (lives in [place])  Children - 2 girls, 1 boy  1 daughter 20 - no major medical  problems    Interval history - 8/29/2024  Returns for follow-up after about 2 months.  He remains free of chest pain, shortness of breath, palpitations or dizziness. He was doing regular exercises until 6/2024, recently has been busy. Now back to exercise with HIT training, including about 8 min treadmill (6 mph), without any symptoms.    Interval history - 11/21/2024  He returns for 3-month follow-up.  He continues to do well and remains free of any chest pain, shortness of breath or palpitations.  He is very active and exercising regularly.  He recently had a steps challenge at work, and he did 14-15k steps/day on average.     Interval history - 5/7/2025  He returns for follow-up after 6 months.  He remains free of chest pain, shortness of breath, palpitations.  Remains compliant with medical therapy  He continues to do high intensity exercise training 2-3 times/week and 10k+ steps/day on most days.     Historical Information   Past Medical History:   Diagnosis Date   • Hypercholesteremia    • Hyperlipidemia      Past Surgical History:   Procedure Laterality Date   • WISDOM TOOTH EXTRACTION  1994     Family History   Problem Relation Age of Onset   • Hyperlipidemia Mother    • No Known Problems Father    • Heart attack Maternal Grandfather      Current Outpatient Medications   Medication Instructions   • busPIRone (BUSPAR) 5 mg, Oral, 2 times daily   • ezetimibe (ZETIA) 10 mg, Oral, Daily   • rosuvastatin (CRESTOR) 40 mg, Oral, Daily      No Known Allergies  Social History     Socioeconomic History   • Marital status: /Civil Union     Spouse name: None   • Number of children: None   • Years of education: None   • Highest education level: None   Occupational History   • None   Tobacco Use   • Smoking status: Never     Passive exposure: Never   • Smokeless tobacco: Never   Vaping Use   • Vaping status: Never Used   Substance and Sexual Activity   • Alcohol use: Yes     Alcohol/week: 17.0 standard drinks of  "alcohol     Types: 2 Cans of beer, 15 Shots of liquor per week   • Drug use: Never   • Sexual activity: Yes     Partners: Female     Birth control/protection: Coitus interruptus   Other Topics Concern   • None   Social History Narrative   • None     Social Drivers of Health     Financial Resource Strain: Not on file   Food Insecurity: Not on file   Transportation Needs: Not on file   Physical Activity: Not on file   Stress: Not on file   Social Connections: Not on file   Intimate Partner Violence: Not on file   Housing Stability: Not on file        Review of Systems   All other systems reviewed and are negative.        Vitals:  Vitals:    05/07/25 1310   BP: 142/88   BP Location: Left arm   Patient Position: Sitting   Cuff Size: Large   Pulse: (!) 116   Resp: 16   SpO2: 97%   Weight: 117 kg (258 lb)   Height: 5' 11.26\" (1.81 m)     BMI - Body mass index is 35.72 kg/m².  Wt Readings from Last 7 Encounters:   05/07/25 117 kg (258 lb)   03/14/25 117 kg (257 lb)   11/21/24 117 kg (258 lb)   08/30/24 114 kg (251 lb)   08/29/24 116 kg (255 lb)   06/21/24 111 kg (245 lb)   06/03/24 116 kg (255 lb 3.2 oz)       Physical Exam  Vitals and nursing note reviewed.   Constitutional:       General: He is not in acute distress.     Appearance: Normal appearance. He is well-developed. He is obese. He is not ill-appearing or diaphoretic.   HENT:      Head: Normocephalic and atraumatic.      Nose: No congestion.   Eyes:      General: No scleral icterus.     Conjunctiva/sclera: Conjunctivae normal.   Neck:      Vascular: No carotid bruit or JVD.   Cardiovascular:      Rate and Rhythm: Regular rhythm. Tachycardia present.      Heart sounds: Normal heart sounds. No murmur heard.     No friction rub. No gallop.   Pulmonary:      Effort: Pulmonary effort is normal. No respiratory distress.      Breath sounds: Normal breath sounds. No wheezing or rales.   Chest:      Chest wall: No tenderness.   Abdominal:      General: There is no " "distension.      Palpations: Abdomen is soft.      Tenderness: There is no abdominal tenderness.   Musculoskeletal:         General: No swelling, tenderness or deformity.      Cervical back: Neck supple. No muscular tenderness.      Right lower leg: No edema.      Left lower leg: No edema.   Skin:     General: Skin is warm.   Neurological:      General: No focal deficit present.      Mental Status: He is alert and oriented to person, place, and time. Mental status is at baseline.   Psychiatric:         Mood and Affect: Mood normal.         Behavior: Behavior normal.         Thought Content: Thought content normal.           Labs:  CBC:   Lab Results   Component Value Date    WBC 5.58 08/26/2024    RBC 5.01 08/26/2024    HGB 15.6 08/26/2024    HCT 46.2 08/26/2024    MCV 92 08/26/2024     08/26/2024    RDW 12.3 08/26/2024       CMP:   Lab Results   Component Value Date    K 4.5 05/06/2025     05/06/2025    CO2 27 05/06/2025    BUN 10 05/06/2025    CREATININE 0.97 05/06/2025    EGFR 93 05/06/2025    CALCIUM 9.9 05/06/2025    AST 38 05/06/2025    ALT 54 (H) 05/06/2025    ALKPHOS 92 05/06/2025       Magnesium:  No results found for: \"MG\"    Lipid Profile:   Lab Results   Component Value Date    HDL 68 05/06/2025    TRIG 83 05/06/2025    LDLCALC 104 (H) 05/06/2025       Thyroid Studies:   Lab Results   Component Value Date    FED0USKHYZWV 3.516 08/26/2024       A1c:  No components found for: \"HGA1C\"    INR:  No results found for: \"INR\"5    Cardiac testing:   No results found for this or any previous visit.    No results found for this or any previous visit.    No results found for this or any previous visit.    No results found for this or any previous visit.      CT coronary calcium score  Narrative: CT CORONARY ARTERY CALCIUM SCREENING WITHOUT INTRAVENOUS CONTRAST    INDICATION: E78.49: Other hyperlipidemia  Z82.49: Family history of ischemic heart disease and other diseases of the circulatory system. " Assess for calcific coronary plaque.  Patient Age: 44 years.  Patient Gender: Male.    COMPARISON: None.    TECHNIQUE: Low radiation dose computed tomography of the heart was performed with EKG gating, suspended respiration, and without intravenous contrast. This examination, like all CT scans performed in the [facility], was performed   utilizing techniques to minimize radiation dose exposure, including the use of iterative reconstruction and automated exposure control. Radiation dose length product (DLP) for this visit: 149 mGy-cm    Postprocessing was performed on a computer workstation to measure the amount of calcium in the coronary arteries. Imaging was limited to the heart and the immediately adjacent lungs.    FINDINGS:    Coronary artery calcium score breakdown is as follows:    Left main coronary artery: 0  Left anterior descending coronary artery and diagonal branches: 0  Left circumflex coronary artery and left marginal branches: 0  Right coronary artery and right marginal branches: 0  Posterior descending coronary artery: 0    TOTAL coronary calcium score: 0    The PROBABILITY of a non-zero calcium score in participants in the in the Multi-Ethnic Study of Atherosclerosis (JUSTICE) trial matched to this patient's age, race and gender is: 28%    HEART/GREAT VESSELS: No significant low radiation dose noncontrast CT abnormality of the heart. No aneurysmal dilatation within visualized segments of thoracic aorta.    EXTRACARDIAC FINDINGS: No significant findings identified on limited small field of view low radiation dose noncontrast images of the chest at the level of the heart. Small left hepatic lobe cyst noted in the visualized upper abdomen.  Impression: Total coronary calcium score equals 0. For more useful information regarding the prognostic significance of the calcium score, please consult the calculator at the website [email or web address].    Workstation  performed: [id number]

## 2025-06-06 DIAGNOSIS — Z00.6 ENCOUNTER FOR EXAMINATION FOR NORMAL COMPARISON OR CONTROL IN CLINICAL RESEARCH PROGRAM: ICD-10-CM

## 2025-06-12 ENCOUNTER — APPOINTMENT (OUTPATIENT)
Dept: LAB | Facility: HOSPITAL | Age: 45
End: 2025-06-12
Payer: COMMERCIAL

## 2025-06-12 DIAGNOSIS — Z00.6 ENCOUNTER FOR EXAMINATION FOR NORMAL COMPARISON OR CONTROL IN CLINICAL RESEARCH PROGRAM: ICD-10-CM

## 2025-06-12 PROCEDURE — 36415 COLL VENOUS BLD VENIPUNCTURE: CPT

## 2025-06-16 ENCOUNTER — OFFICE VISIT (OUTPATIENT)
Dept: FAMILY MEDICINE CLINIC | Facility: CLINIC | Age: 45
End: 2025-06-16
Payer: COMMERCIAL

## 2025-06-16 VITALS
HEART RATE: 92 BPM | SYSTOLIC BLOOD PRESSURE: 139 MMHG | BODY MASS INDEX: 34.21 KG/M2 | OXYGEN SATURATION: 97 % | DIASTOLIC BLOOD PRESSURE: 82 MMHG | WEIGHT: 258.13 LBS | HEIGHT: 73 IN | TEMPERATURE: 96.3 F

## 2025-06-16 DIAGNOSIS — F41.9 ANXIETY: Primary | ICD-10-CM

## 2025-06-16 DIAGNOSIS — E78.00 HYPERCHOLESTEROLEMIA: ICD-10-CM

## 2025-06-16 DIAGNOSIS — R73.9 HYPERGLYCEMIA: ICD-10-CM

## 2025-06-16 PROCEDURE — 99213 OFFICE O/P EST LOW 20 MIN: CPT | Performed by: FAMILY MEDICINE

## 2025-06-16 NOTE — PROGRESS NOTES
"Assessment/Plan:    No problem-specific Assessment & Plan notes found for this encounter.       Diagnoses and all orders for this visit:    Anxiety    Hypercholesterolemia  -     CBC and differential; Future  -     Comprehensive metabolic panel; Future  -     Lipid panel; Future  -     TSH, 3rd generation with Free T4 reflex; Future    Hyperglycemia  -     Comprehensive metabolic panel; Future          PHQ-2/9 Depression Screening    Little interest or pleasure in doing things: 0 - not at all  Feeling down, depressed, or hopeless: 0 - not at all  PHQ-2 Score: 0  PHQ-2 Interpretation: Negative depression screen            Subjective:      Patient ID: Servando Aviles is a 45 y.o. male.    Follow up for anxiety pt was placed on buspar pt states \"100%\" improvement in the medication    Anxiety  Presents for follow-up visit. Patient reports no chest pain, dry mouth, excessive worry, hyperventilation, nervous/anxious behavior, palpitations, panic or shortness of breath.         The following portions of the patient's history were reviewed and updated as appropriate: allergies, current medications, past family history, past medical history, past social history, past surgical history, and problem list.    Review of Systems   Respiratory:  Negative for shortness of breath and wheezing.    Cardiovascular:  Negative for chest pain and palpitations.   Psychiatric/Behavioral:  The patient is not nervous/anxious.          Objective:    /82   Pulse 92   Temp (!) 96.3 °F (35.7 °C)   Ht 6' 1\" (1.854 m)   Wt 117 kg (258 lb 2 oz)   SpO2 97%   BMI 34.06 kg/m²      Physical Exam  Vitals and nursing note reviewed.   Constitutional:       General: He is not in acute distress.     Appearance: Normal appearance. He is not ill-appearing or diaphoretic.   HENT:      Head: Normocephalic and atraumatic.     Eyes:      Conjunctiva/sclera: Conjunctivae normal.       Cardiovascular:      Rate and Rhythm: Normal rate and regular " rhythm.      Heart sounds: Normal heart sounds. No murmur heard.  Pulmonary:      Effort: Pulmonary effort is normal. No respiratory distress.      Breath sounds: Normal breath sounds. No wheezing, rhonchi or rales.   Abdominal:      General: There is no distension.      Palpations: Abdomen is soft. There is no mass.      Tenderness: There is no abdominal tenderness. There is no guarding or rebound.     Musculoskeletal:      Cervical back: Normal range of motion and neck supple. No rigidity.      Right lower leg: No edema.      Left lower leg: No edema.   Lymphadenopathy:      Cervical: No cervical adenopathy.     Neurological:      Mental Status: He is alert and oriented to person, place, and time.     Psychiatric:         Mood and Affect: Mood normal.         Behavior: Behavior normal.         Thought Content: Thought content normal.         Judgment: Judgment normal.

## 2025-07-15 LAB
APOB+LDLR+PCSK9 GENE MUT ANL BLD/T: ABNORMAL
BRCA1+BRCA2 DEL+DUP + FULL MUT ANL BLD/T: NOT DETECTED
GENE DIS ANL INTERP-IMP: POSITIVE
MLH1+MSH2+MSH6+PMS2 GN DEL+DUP+FUL M: NOT DETECTED

## 2025-07-16 ENCOUNTER — RESULTS FOLLOW-UP (OUTPATIENT)
Dept: OTHER | Facility: HOSPITAL | Age: 45
End: 2025-07-16

## 2025-07-16 NOTE — TELEPHONE ENCOUNTER
Servando participated in the DNA Osmosis study. I spoke to Servando about his results specific to FH. He reviewed his results prior to our conversation. Servando has a medical history of high cholesterol and is already receiving treatment for it. He declined genetic counseling because he prefers to speak to his physicians. Servando can contact [employer] at [phone] if he changes his mind about genetic counseling.